# Patient Record
Sex: FEMALE | Race: WHITE
[De-identification: names, ages, dates, MRNs, and addresses within clinical notes are randomized per-mention and may not be internally consistent; named-entity substitution may affect disease eponyms.]

---

## 2017-02-10 ENCOUNTER — HOSPITAL ENCOUNTER (OUTPATIENT)
Dept: HOSPITAL 47 - LABPAT | Age: 57
Discharge: HOME | End: 2017-02-10
Payer: COMMERCIAL

## 2017-02-10 DIAGNOSIS — Z01.812: Primary | ICD-10-CM

## 2017-02-10 LAB
ANION GAP SERPL CALC-SCNC: 11 MMOL/L
APTT BLD: 24 SEC (ref 22–30)
BUN SERPL-SCNC: 17 MG/DL (ref 7–17)
CALCIUM SPEC-MCNC: 10 MG/DL (ref 8.4–10.2)
CELLS COUNTED: 100
CH: 29.8
CHCM: 33.1
CHLORIDE SERPL-SCNC: 101 MMOL/L (ref 98–107)
CO2 SERPL-SCNC: 29 MMOL/L (ref 22–30)
ERYTHROCYTE [DISTWIDTH] IN BLOOD BY AUTOMATED COUNT: 4.55 M/UL (ref 3.8–5.4)
ERYTHROCYTE [DISTWIDTH] IN BLOOD: 12.9 % (ref 11.5–15.5)
GLUCOSE SERPL-MCNC: 106 MG/DL (ref 74–99)
HCT VFR BLD AUTO: 41.2 % (ref 34–46)
HDW: 2.37
HGB BLD-MCNC: 13.4 GM/DL (ref 11.4–16)
INR PPP: 1.1 (ref ?–1.1)
MCH RBC QN AUTO: 29.4 PG (ref 25–35)
MCHC RBC AUTO-ENTMCNC: 32.5 G/DL (ref 31–37)
MCV RBC AUTO: 90.4 FL (ref 80–100)
NON-AFRICAN AMERICAN GFR(MDRD): >60
PH UR: 7 [PH] (ref 5–8)
POTASSIUM SERPL-SCNC: 4.8 MMOL/L (ref 3.5–5.1)
PT BLD: 10.8 SEC (ref 9–12)
SODIUM SERPL-SCNC: 141 MMOL/L (ref 137–145)
SP GR UR: 1.02 (ref 1–1.03)
UA BILLING (MACRO VS. MICRO): (no result)
UROBILINOGEN UR QL STRIP: <2 MG/DL (ref ?–2)
WBC # BLD AUTO: 10 K/UL (ref 3.8–10.6)
WBC (PEROX): 16.62

## 2017-02-10 PROCEDURE — 85025 COMPLETE CBC W/AUTO DIFF WBC: CPT

## 2017-02-10 PROCEDURE — 87070 CULTURE OTHR SPECIMN AEROBIC: CPT

## 2017-02-10 PROCEDURE — 81003 URINALYSIS AUTO W/O SCOPE: CPT

## 2017-02-10 PROCEDURE — 80048 BASIC METABOLIC PNL TOTAL CA: CPT

## 2017-02-10 PROCEDURE — 85730 THROMBOPLASTIN TIME PARTIAL: CPT

## 2017-02-10 PROCEDURE — 85610 PROTHROMBIN TIME: CPT

## 2017-03-06 ENCOUNTER — HOSPITAL ENCOUNTER (INPATIENT)
Dept: HOSPITAL 47 - 2ORMAIN | Age: 57
LOS: 1 days | Discharge: HOME | DRG: 473 | End: 2017-03-07
Payer: COMMERCIAL

## 2017-03-06 VITALS — BODY MASS INDEX: 29 KG/M2

## 2017-03-06 VITALS — RESPIRATION RATE: 16 BRPM

## 2017-03-06 DIAGNOSIS — M48.02: Primary | ICD-10-CM

## 2017-03-06 DIAGNOSIS — G43.909: ICD-10-CM

## 2017-03-06 DIAGNOSIS — M25.78: ICD-10-CM

## 2017-03-06 DIAGNOSIS — K21.9: ICD-10-CM

## 2017-03-06 DIAGNOSIS — F32.9: ICD-10-CM

## 2017-03-06 DIAGNOSIS — I48.91: ICD-10-CM

## 2017-03-06 DIAGNOSIS — N32.81: ICD-10-CM

## 2017-03-06 DIAGNOSIS — Z79.51: ICD-10-CM

## 2017-03-06 DIAGNOSIS — M50.123: ICD-10-CM

## 2017-03-06 DIAGNOSIS — M50.322: ICD-10-CM

## 2017-03-06 DIAGNOSIS — Z79.899: ICD-10-CM

## 2017-03-06 DIAGNOSIS — Z79.82: ICD-10-CM

## 2017-03-06 DIAGNOSIS — J44.9: ICD-10-CM

## 2017-03-06 LAB — GLUCOSE BLD-MCNC: 93 MG/DL (ref 75–99)

## 2017-03-06 PROCEDURE — 0RG10K0 FUSION OF CERVICAL VERTEBRAL JOINT WITH NONAUTOLOGOUS TISSUE SUBSTITUTE, ANTERIOR APPROACH, ANTERIOR COLUMN, OPEN APPROACH: ICD-10-PCS

## 2017-03-06 PROCEDURE — 86900 BLOOD TYPING SEROLOGIC ABO: CPT

## 2017-03-06 PROCEDURE — 86850 RBC ANTIBODY SCREEN: CPT

## 2017-03-06 PROCEDURE — 86901 BLOOD TYPING SEROLOGIC RH(D): CPT

## 2017-03-06 PROCEDURE — 72020 X-RAY EXAM OF SPINE 1 VIEW: CPT

## 2017-03-06 RX ADMIN — BENZOCAINE AND MENTHOL PRN EACH: 15; 3.6 LOZENGE ORAL at 22:21

## 2017-03-06 RX ADMIN — POTASSIUM CHLORIDE SCH MLS: 14.9 INJECTION, SOLUTION INTRAVENOUS at 13:37

## 2017-03-06 RX ADMIN — OXYBUTYNIN CHLORIDE SCH MG: 10 TABLET, EXTENDED RELEASE ORAL at 19:54

## 2017-03-06 RX ADMIN — HYDROMORPHONE HYDROCHLORIDE PRN MG: 1 INJECTION, SOLUTION INTRAMUSCULAR; INTRAVENOUS; SUBCUTANEOUS at 20:38

## 2017-03-06 RX ADMIN — POTASSIUM CHLORIDE SCH: 14.9 INJECTION, SOLUTION INTRAVENOUS at 23:42

## 2017-03-06 RX ADMIN — DILTIAZEM HYDROCHLORIDE SCH MG: 30 TABLET, FILM COATED ORAL at 19:54

## 2017-03-06 RX ADMIN — HYDROMORPHONE HYDROCHLORIDE PRN MG: 1 INJECTION, SOLUTION INTRAMUSCULAR; INTRAVENOUS; SUBCUTANEOUS at 23:44

## 2017-03-06 RX ADMIN — HYDROMORPHONE HYDROCHLORIDE PRN MG: 1 INJECTION, SOLUTION INTRAMUSCULAR; INTRAVENOUS; SUBCUTANEOUS at 17:30

## 2017-03-06 RX ADMIN — HYDROMORPHONE HYDROCHLORIDE PRN MG: 1 INJECTION, SOLUTION INTRAMUSCULAR; INTRAVENOUS; SUBCUTANEOUS at 17:36

## 2017-03-06 RX ADMIN — CEFAZOLIN SCH MLS/HR: 330 INJECTION, POWDER, FOR SOLUTION INTRAMUSCULAR; INTRAVENOUS at 23:51

## 2017-03-06 RX ADMIN — CEFAZOLIN SCH: 330 INJECTION, POWDER, FOR SOLUTION INTRAMUSCULAR; INTRAVENOUS at 19:50

## 2017-03-06 RX ADMIN — HYDROCODONE BITARTRATE AND ACETAMINOPHEN PRN EACH: 5; 325 TABLET ORAL at 22:24

## 2017-03-06 NOTE — XR
EXAMINATION TYPE: XR cervical spine 1V

 

DATE OF EXAM: 3/6/2017 5:12 PM

 

COMPARISON: Today

 

HISTORY: Hardware placement

 

TECHNIQUE: Single view

 

FINDINGS: There is a plate with screws fusing anteriorly the lower cervical spine at C5-6. I see no c
omplicating process.

 

IMPRESSION: No complicating process seen.

## 2017-03-06 NOTE — P.OP
Date of Procedure: 03/06/17


Preoperative Diagnosis: 


Cervical stenosis C5 6


Herniated nucleus pulposis C5 6


Degenerative disc disease C5 6 C6 7


Large osteophytic spurring C5 6


Upper extremity radiculopathy


Postoperative Diagnosis: 


Same


Anesthesia: GETA


Pathology: none sent


Condition: stable


Disposition: PACU


Description of Procedure: 


BRIEF OPERATIVE NOTE





Preoperative Diagnosis: Cervical stenosis C5 6, degenerative disc disease C5 6 

C6 7, herniated nucleus was a C5 6, large osteophytic spurring C5 6, upper 

extremity radiculopathy


Postoperative Diagnosis: Same


Procedure: Anterior cervical decompression and fusion C5 6


                  Placement of allograft interbody graft C5 6


                  Application of anterior cervical plate C5 6


Surgeon: Dr. Morrison


Assistant: Cabrera SORENSON who is present throughout the entire the case 

persistence during positioning, dissection, exposure, visualization, and all 

crucial elements of the case as well as closure.


Anesthesia: General anesthesia


Estimated blood loss: Approximately 50 mL


Complications: None apparent


Components implanted: K2M anterior cervical plate system with screws and Vikos 

allograft bone graft


Disposition: To recovery room in good stable condition.





OPERATIVE INDICATIONS





The patient has had long-standing issues in their neck and upper extremities.  

She is not have degenerative disc changes C5 6 and C6 7 with significant 

stenosis at C5 6 with herniated nucleus pulposis.  She had large osteophytic 

spurring as well.  She had significant upper extremity radiculopathy.  Her 

imaging correlated well with her upper extremity and back symptoms.  The 

patient has been through conservative treatment.  We discussed various 

treatment options including surgery, at C5 6 and possibly at C6 7, and the 

patient wishes to proceed with surgery We discussed the risk, patient's 

alternatives and benefits of surgery including but not limited to, risk of 

bleeding risk of infection, risk of need for further surgery, risk of decreased

, loss of motion, muscle function, malunion nonunion, hardware failure, nerve 

damage, paralysis, heart attack, and death.





OPERATIVE SUMMARY





After discussing all the risks, patient alternatives and benefits at length, 

the patient elected to proceed with surgical intervention, signed informed 

consent, and presented for their procedure.  The patient was seen and examined 

in the preoperative holding area and the surgical site was marked.  The patient 

was given antibiotics and brought to the operating room.





The patient was positioned on the operating room table in a supine position 

being careful to pad any bony prominences and pressure points.  The patient was 

sedated and intubated by anesthesia in standard fashion.  Once the airway and C-

spine were stabilized the patient's arms were padded and tucked at her side, 

with her shoulders gently taped.  The head was placed in a donut pad with the 

neck in good neutral alignment and position.  We were careful to maintain the 

patient's cervical spine and good neutral alignment and position throughout.





The patient was prepped and draped in a normal standard fashion.  An 

appropriate timeout and keystone protocol performed.  We were able to proceed 

with the surgery.  The local wound area was infiltrated with local anesthetic. 





An incision was made transversely approximately 2-1/2 cm over the appropriate 

levels of C5 6.  Patient has large body habitus and this may benefit dissection 

somewhat more difficult.  Dissection was taken down subcutaneously to the level 

of the platysma which was split in line with its fibers.  Dissection was taken 

with a carotid approach, with the trachea and esophagus medial and the carotid 

sheath laterally.  We dissected down to the anterior surface of the vertebral 

bodies.  Intraoperative x-ray was taken which showed a marker at the 

appropriate level of C5 6. there were large anterior osteophytes at C5 6 which 

are palpable.  With the appropriate level positively confirmed, we were able to 

proceed with discectomy at the appropriate levels.  All of the operative levels 

were exposed appropriately. The patient had all their twitches back, and there 

was no evidence of recurrent laryngeal issue.  The wound was copiously 

irrigated and suctioned dry as had been done periodically throughout the case.  

At the appropriate level/levels, I established an annulotomy with an 11 blade 

scalpel.  A discectomy was performed with a combination of pituitary rongeurs, 

curettes, a high-speed bur, and Kerrison rongeurs.   there were very large 

anterior osteophytes and the large posterior osteophytes which were taken down 

as well.  The posterior longitudinal ligament was taken down as were any 

posterior osteophytes.  This gave good central and bilateral foraminal 

decompression.  There is no evidence of any dural tear or leak.  The endplates 

were prepared with a high-speed bur.  With the endplates in good parallel 

position, I was able to size for the appropriate size interbody graft.  The 

wound was irrigated and suctioned dry the graft was prepared and malleted into 

position.  It had good alignment and position with the anterior surface flush 

with the anterior surface of the vertebral bodies.  patient did have some 

degenerative changes at C6 7 but I felt that the large majority of her issues 

stem from the C5 6 level and decided to forego further dissection and fusion at 

C6 7.  





With the grafts intact, I was able to measure and contour and appropriate sized 

plate.  The plate was positioned at the midline over the appropriate levels at 

C5 6 .  Screw holes were established with a hand drill and drill guide.  Screws 

were placed in good alignment and position with excellent bony purchase.  They 

were seated under the locking device.  The construct was checked and found to 

be stable.  Intraoperative x-ray was taken which showed good alignment and 

position of the implants at the appropriate levels health of C5 6 .  There was 

no evidence of any dural tear or leak.  Good hemostasis was maintained.  The 

wound was copiously irrigated and suctioned dry as had been done  periodically 

throughout the case.





The platysma was closed with absorbable suture.  The subcutaneous tissue was 

closed.  The subcuticular tissue was closed with absorbable suture.  The wound 

was cleaned and dried and dressed appropriately.





A soft cervical collar was placed appropriately.  The patient was woken up by 

anesthesia, extubated, transferred back gently to their hospital bed and 

brought to the recovery room in good stable condition.





The patient will be admitted to the hospital for appropriate postoperative care

, medical management and monitoring.  We will continue to follow them closely 

about the postoperative course.

## 2017-03-06 NOTE — XR
EXAMINATION TYPE: XR cervical spine 1V

 

DATE OF EXAM: 3/6/2017 4:21 PM

 

COMPARISON: NONE

 

HISTORY: Neck pain, surgical study.

 

TECHNIQUE: Crosstable lateral view of cervical spine is obtained intraoperatively.

 

FINDINGS: Exam is for surgical planning and not for diagnostic purposes. A metallic pointer is noted 
at C5-C6 level where there is moderate spurring and disc space narrowing.

 

IMPRESSION: As above

## 2017-03-07 VITALS — TEMPERATURE: 98.9 F | SYSTOLIC BLOOD PRESSURE: 129 MMHG | HEART RATE: 69 BPM | DIASTOLIC BLOOD PRESSURE: 70 MMHG

## 2017-03-07 RX ADMIN — DILTIAZEM HYDROCHLORIDE SCH MG: 30 TABLET, FILM COATED ORAL at 07:09

## 2017-03-07 RX ADMIN — HYDROMORPHONE HYDROCHLORIDE PRN MG: 1 INJECTION, SOLUTION INTRAMUSCULAR; INTRAVENOUS; SUBCUTANEOUS at 02:44

## 2017-03-07 RX ADMIN — OXYBUTYNIN CHLORIDE SCH MG: 10 TABLET, EXTENDED RELEASE ORAL at 07:09

## 2017-03-07 RX ADMIN — BENZOCAINE AND MENTHOL PRN EACH: 15; 3.6 LOZENGE ORAL at 07:54

## 2017-03-07 RX ADMIN — HYDROCODONE BITARTRATE AND ACETAMINOPHEN PRN EACH: 5; 325 TABLET ORAL at 05:18

## 2017-05-04 ENCOUNTER — HOSPITAL ENCOUNTER (OUTPATIENT)
Dept: HOSPITAL 47 - RADUSWWP | Age: 57
Discharge: HOME | End: 2017-05-04
Payer: COMMERCIAL

## 2017-05-04 DIAGNOSIS — R10.9: Primary | ICD-10-CM

## 2017-05-04 PROCEDURE — 76700 US EXAM ABDOM COMPLETE: CPT

## 2017-05-05 NOTE — US
EXAMINATION TYPE: US abdomen complete

 

DATE OF EXAM: 5/4/2017 4:51 PM

 

COMPARISON: NONE

 

CLINICAL HISTORY: R10.84 ABDOMINAL PAIN. Epigastric pain, NPO, GB removed 1993

 

EXAM MEASUREMENTS:

 

Liver Length:  16.5 cm   

CHD:  1.3 cm

Spleen:  11.5 cm   

Right Kidney:  13.0 x 4.8 x 4.7 cm 

Left Kidney:  13.8 x 4.6 x 4.7 cm   

 

Suboptimal visualization due to patient body habitus 

 

Pancreas:  echogenic, tail not well seen due to overlying bowel gas 

Liver:  echogenic, heterogenous  

Gallbladder:  Surgically absent

CHD:  Dilated 

Spleen:  wnl   

Right Kidney:  wnl   

Left Kidney:  wnl   

Upper IVC:  seen  

Abd Aorta:  seen

 

The liver is normal in size but echogenic and likely fatty infiltrated.

 

The pancreas is also echogenic and may be fatty infiltrated.

 

The gallbladder is been removed. Distal common hepatic duct measures 1.3 cm.

 

The spleen is normal in size.

 

Both kidneys appear normal.

 

Visualized portions of aorta and IVC are normal.

 

IMPRESSION: 

 

PROBABLE FATTY INFILTRATION OF THE LIVER.

## 2017-06-08 ENCOUNTER — HOSPITAL ENCOUNTER (OUTPATIENT)
Dept: HOSPITAL 47 - RADCTMAIN | Age: 57
Discharge: HOME | End: 2017-06-08
Payer: COMMERCIAL

## 2017-06-08 DIAGNOSIS — K44.9: ICD-10-CM

## 2017-06-08 DIAGNOSIS — R91.8: Primary | ICD-10-CM

## 2017-06-08 PROCEDURE — 71260 CT THORAX DX C+: CPT

## 2017-06-08 NOTE — CT
EXAMINATION TYPE: CT chest w con

 

DATE OF EXAM: 6/8/2017

 

COMPARISON: 3/18/2013

 

HISTORY: 57-year-old female Chronic bronchitis

 

TECHNIQUE: Contiguous axial scanning of the chest after the administration of 100 ml mL of Omnipaque 
300.  Coronal/sagittal reconstructions performed.

 

CT DLP: 690.80mGycm. Automatic exposure control utilized for a dose reduction.

 

 

FINDINGS:

The heart is normal size with trace anterior pericardial thickening/fluid.

 

Aorta is normal caliber with conventional arch vessel branching anatomy.

 

No thoracic lymphadenopathy by CT size criteria.

 

Evaluation of the lungs shows calcified right hilar lymph nodes suggesting prior granulomatous diseas
e. No consolidation or pleural effusion.

 

5 mm pulmonary nodule left mid lung along the major fissure. Axial image 30.

6 mm pulmonary nodule anteromedial right middle lobe axial image 34.

 

Tiny hiatal hernia.  Low density of the hepatic parenchyma suggests underlying hepatic steatosis. Cho
lecystectomy clips are present.

 

Bones: Degenerative disc disease mid to lower thoracic spine with anterior endplate spondylosis.

 

 

 

IMPRESSION:  

 

1. No acute pulmonary process.

2. A pulmonary nodule, one in each side measuring up to 6 cm. 6-12 followed by 18-24 month follow-up 
exams are recommended to ensure stability.

3. Tiny hiatal hernia and suspected hepatic steatosis.

## 2017-06-14 ENCOUNTER — HOSPITAL ENCOUNTER (OUTPATIENT)
Dept: HOSPITAL 47 - LABWHC1 | Age: 57
Discharge: HOME | End: 2017-06-14
Payer: COMMERCIAL

## 2017-06-14 DIAGNOSIS — R06.00: Primary | ICD-10-CM

## 2017-06-14 LAB
ALP SERPL-CCNC: 98 U/L (ref 38–126)
ALT SERPL-CCNC: 46 U/L (ref 9–52)
ANION GAP SERPL CALC-SCNC: 10 MMOL/L
AST SERPL-CCNC: 23 U/L (ref 14–36)
BASOPHILS # BLD AUTO: 0 K/UL (ref 0–0.2)
BASOPHILS NFR BLD AUTO: 0 %
BUN SERPL-SCNC: 14 MG/DL (ref 7–17)
CALCIUM SPEC-MCNC: 9.9 MG/DL (ref 8.4–10.2)
CH: 30.5
CHCM: 34.8
CHLORIDE SERPL-SCNC: 107 MMOL/L (ref 98–107)
CK SERPL-CCNC: 161 U/L (ref 30–135)
CO2 SERPL-SCNC: 27 MMOL/L (ref 22–30)
EOSINOPHIL # BLD AUTO: 0.4 K/UL (ref 0–0.7)
EOSINOPHIL NFR BLD AUTO: 4 %
ERYTHROCYTE [DISTWIDTH] IN BLOOD BY AUTOMATED COUNT: 4.5 M/UL (ref 3.8–5.4)
ERYTHROCYTE [DISTWIDTH] IN BLOOD: 12.9 % (ref 11.5–15.5)
GLUCOSE SERPL-MCNC: 98 MG/DL (ref 74–99)
HCT VFR BLD AUTO: 39.6 % (ref 34–46)
HDW: 2.61
HGB BLD-MCNC: 13.5 GM/DL (ref 11.4–16)
LUC NFR BLD AUTO: 2 %
LYMPHOCYTES # SPEC AUTO: 2 K/UL (ref 1–4.8)
LYMPHOCYTES NFR SPEC AUTO: 21 %
MCH RBC QN AUTO: 29.9 PG (ref 25–35)
MCHC RBC AUTO-ENTMCNC: 33.9 G/DL (ref 31–37)
MCV RBC AUTO: 88 FL (ref 80–100)
MONOCYTES # BLD AUTO: 0.4 K/UL (ref 0–1)
MONOCYTES NFR BLD AUTO: 5 %
NEUTROPHILS # BLD AUTO: 6.4 K/UL (ref 1.3–7.7)
NEUTROPHILS NFR BLD AUTO: 68 %
NON-AFRICAN AMERICAN GFR(MDRD): >60
POTASSIUM SERPL-SCNC: 4.2 MMOL/L (ref 3.5–5.1)
PROT SERPL-MCNC: 7.6 G/DL (ref 6.3–8.2)
RHEUMATOID FACT SERPL-ACNC: <9 IU/ML (ref ?–12)
SODIUM SERPL-SCNC: 144 MMOL/L (ref 137–145)
WBC # BLD AUTO: 0.23 10*3/UL
WBC # BLD AUTO: 9.4 K/UL (ref 3.8–10.6)
WBC (PEROX): 9.57

## 2017-06-14 PROCEDURE — 80053 COMPREHEN METABOLIC PANEL: CPT

## 2017-06-14 PROCEDURE — 82550 ASSAY OF CK (CPK): CPT

## 2017-06-14 PROCEDURE — 85025 COMPLETE CBC W/AUTO DIFF WBC: CPT

## 2017-06-14 PROCEDURE — 86431 RHEUMATOID FACTOR QUANT: CPT

## 2017-06-14 PROCEDURE — 85652 RBC SED RATE AUTOMATED: CPT

## 2017-06-14 PROCEDURE — 84439 ASSAY OF FREE THYROXINE: CPT

## 2017-06-14 PROCEDURE — 36415 COLL VENOUS BLD VENIPUNCTURE: CPT

## 2017-06-14 PROCEDURE — 84443 ASSAY THYROID STIM HORMONE: CPT

## 2017-06-14 PROCEDURE — 86038 ANTINUCLEAR ANTIBODIES: CPT

## 2017-06-30 ENCOUNTER — HOSPITAL ENCOUNTER (OUTPATIENT)
Dept: HOSPITAL 47 - RADNMMAIN | Age: 57
Discharge: HOME | End: 2017-06-30
Payer: COMMERCIAL

## 2017-06-30 ENCOUNTER — HOSPITAL ENCOUNTER (OUTPATIENT)
Dept: HOSPITAL 47 - RADCTMAIN | Age: 57
Discharge: HOME | End: 2017-06-30
Payer: COMMERCIAL

## 2017-06-30 DIAGNOSIS — R06.02: ICD-10-CM

## 2017-06-30 DIAGNOSIS — M77.32: Primary | ICD-10-CM

## 2017-06-30 DIAGNOSIS — Z53.9: Primary | ICD-10-CM

## 2017-06-30 DIAGNOSIS — Z98.890: ICD-10-CM

## 2017-06-30 DIAGNOSIS — R10.11: ICD-10-CM

## 2017-06-30 DIAGNOSIS — M79.672: ICD-10-CM

## 2017-06-30 PROCEDURE — 74177 CT ABD & PELVIS W/CONTRAST: CPT

## 2017-06-30 PROCEDURE — 71020: CPT

## 2017-06-30 PROCEDURE — 78582 LUNG VENTILAT&PERFUS IMAGING: CPT

## 2017-06-30 PROCEDURE — 73700 CT LOWER EXTREMITY W/O DYE: CPT

## 2017-06-30 NOTE — XR
EXAMINATION TYPE: XR chest 2V

 

DATE OF EXAM: 6/30/2017

 

COMPARISON: 1/8/15

 

HISTORY: Shortness of breath

 

TECHNIQUE:  Frontal and lateral views of the chest are obtained.

 

FINDINGS:

 

Scattered senescent parenchymal changes noted. Hyperinflation compatible with COPD. 

 

No evidence for infiltrate. No evidence for atelectasis.

 

Heart size is stable.

 

Mediastinal structures are stable and grossly unremarkable.

 

No evidence for hilar prominence.

 

Degenerative changes dorsal spine. 

 

IMPRESSION:

1. No evidence for acute pulmonary disease.

## 2017-06-30 NOTE — NM
EXAMINATION TYPE: NM pul vent and perfuse

 

DATE OF EXAM: 6/30/2017

 

COMPARISON: NONE

 

HISTORY: Shortness of breath

 

TECHNIQUE:  Utilizing inhalation of 70.8 mCi Tc 99m DTPA aerosol and intravenous injection of 5.5 mCi
 of Tc 99m MAA, ventilation and perfusion images are acquired post injection in multiple projections.


 

FINDINGS: 

Normal radiotracer distribution is noted in the lungs. There is no evidence for ventilation/perfusion
 mismatch.

 

IMPRESSION: 

No scintigraphic evidence to suggest PE.

## 2017-07-01 NOTE — CT
EXAMINATION TYPE: CT abdomen pelvis w con

 

DATE OF EXAM: 6/30/2017

 

COMPARISON: NONE

 

INDICATION: Patient complains of epigastric to RUQ pain, tenderness, and difficulty taking a deep radha
ath.

 

DLP: 2520.4 mGycm, Automated exposure control for dose reduction was used.

 

CONTRAST:  100 mL of Omnipaque 300. 

                        Study performed with Oral Contrast

 

TECHNIQUE: Axial images were obtained from above the diaphragm to the pubic rami in the axial plane a
t 5 mm thick sections.  Reconstructed images are reviewed on the computer in the coronal plane.  

 

FINDINGS:

 

Limited CT sections are obtained the lung bases.  The lung bases are clear.

 

CT ABDOMEN:

 

Liver: There is mild fatty infiltration to the liver compared to the spleen. No discrete masses or cy
sts are evident.

 

Spleen: Normal

 

Pancreas: Normal

 

Adrenal glands: The adrenal glands are normal.

 

Gallbladder: Surgically absent  

 

Kidneys: No masses are evident. No hydronephrosis is present.   No cysts are present.  Delayed images
 were obtained through the kidneys, which remain unremarkable.

 

Aorta: Normal .  There is a retrocaval right renal artery.

 

Inferior vena cava: Normal.

 

CT PELVIS: 

Loops of bowel within the abdomen and pelvis are normal.     There are loops of bowel which are incom
pletely distended or lack oral contrast limiting their evaluation.

 

Appendix: Normal as visualized.

 

Urinary bladder: Normal. 

 

Genitourinary structures: Uterus and ovaries are absent. Some residual left ovarian tissue may be pre
sent.

 

Osseous structures: No suspicious lytic or sclerotic lesions. Facet degenerative changes are present.


 

IMPRESSIONS:

1.  No acute abdominal process.

2. Mild fatty infiltration of the liver

## 2017-07-01 NOTE — CT
EXAMINATION TYPE: CT foot LT wo con

 

DATE OF EXAM: 6/30/2017

 

COMPARISON: NONE

 

HISTORY: Left foot pain. Injury 1 year ago with surgery.

 

CT DLP: 143.30 mGycm

Automated exposure control for dose reduction was used.

 

TECHNIQUE: Axial images 2 mm thick sections. Reconstructed images in the coronal and sagittal plane.

 

FINDINGS: 

The ankle mortise appears intact. There is been prior forefoot open reduction internal fixation. [place]im[place]
tarsals as visualized appear intact. Digits within the field-of-view appear normal. Degenerative join
t changes are present through the forefoot.

 

IMPRESSION: 

1. POSTSURGICAL CHANGES THROUGH THE FOREFOOT WITH DEGENERATIVE JOINT CHANGES OF THE TARSOMETATARSAL J
UNCTIONS.

2. NO ACUTE FRACTURE EVIDENT.

3. PLANTAR CALCANEAL HEEL SPUR.

## 2017-07-19 ENCOUNTER — HOSPITAL ENCOUNTER (OUTPATIENT)
Dept: HOSPITAL 47 - RADMAMWWP | Age: 57
Discharge: HOME | End: 2017-07-19
Payer: COMMERCIAL

## 2017-07-19 DIAGNOSIS — Z12.31: Primary | ICD-10-CM

## 2017-07-20 NOTE — MM
Reason for exam: screening  (asymptomatic).

Last mammogram was performed 1 year ago.



History:

Family history of breast cancer in sister at age 30 and breast cancer in sister at

age 48.

Benign ultrasound-guided core biopsy of the right breast, 2004.



Physical Findings:

A clinical breast exam by your physician is recommended on an annual basis and 

results should be correlated with mammographic findings.



MG Screening Mammo w CAD

Bilateral CC and MLO view(s) were taken.

Prior study comparison: July 13, 2016, bilateral MG screening mammo w CAD.  July 28, 2015, bilateral MG screening mammo w CAD.

There are scattered fibroglandular densities.  There is chronic nodularity in the 

right breast, stable.  No significant changes when compared with prior studies.





ASSESSMENT: Benign, BI-RAD 2



RECOMMENDATION:

Routine screening mammogram of both breasts in 1 year.

## 2017-09-18 ENCOUNTER — HOSPITAL ENCOUNTER (OUTPATIENT)
Dept: HOSPITAL 47 - LABWHC1 | Age: 57
Discharge: HOME | End: 2017-09-18
Payer: COMMERCIAL

## 2017-09-18 DIAGNOSIS — E55.9: Primary | ICD-10-CM

## 2017-09-18 DIAGNOSIS — Z47.89: ICD-10-CM

## 2017-09-18 DIAGNOSIS — M79.672: ICD-10-CM

## 2017-09-18 LAB
CH: 30.4
CHCM: 34.2
ERYTHROCYTE [DISTWIDTH] IN BLOOD BY AUTOMATED COUNT: 4.34 M/UL (ref 3.8–5.4)
ERYTHROCYTE [DISTWIDTH] IN BLOOD: 13.5 % (ref 11.5–15.5)
HCT VFR BLD AUTO: 38.9 % (ref 34–46)
HDW: 2.5
HGB BLD-MCNC: 12.8 GM/DL (ref 11.4–16)
MCH RBC QN AUTO: 29.6 PG (ref 25–35)
MCHC RBC AUTO-ENTMCNC: 33.1 G/DL (ref 31–37)
MCV RBC AUTO: 89.5 FL (ref 80–100)
WBC # BLD AUTO: 7.7 K/UL (ref 3.8–10.6)

## 2017-09-18 PROCEDURE — 85027 COMPLETE CBC AUTOMATED: CPT

## 2017-09-18 PROCEDURE — 83520 IMMUNOASSAY QUANT NOS NONAB: CPT

## 2017-09-18 PROCEDURE — 82040 ASSAY OF SERUM ALBUMIN: CPT

## 2017-09-18 PROCEDURE — 86140 C-REACTIVE PROTEIN: CPT

## 2017-09-18 PROCEDURE — 36415 COLL VENOUS BLD VENIPUNCTURE: CPT

## 2017-09-18 PROCEDURE — 82306 VITAMIN D 25 HYDROXY: CPT

## 2017-09-18 PROCEDURE — 85652 RBC SED RATE AUTOMATED: CPT

## 2017-11-08 ENCOUNTER — HOSPITAL ENCOUNTER (OUTPATIENT)
Dept: HOSPITAL 47 - SLEEP | Age: 57
End: 2017-11-08
Attending: INTERNAL MEDICINE
Payer: COMMERCIAL

## 2017-11-08 DIAGNOSIS — Z79.82: ICD-10-CM

## 2017-11-08 DIAGNOSIS — Z90.721: ICD-10-CM

## 2017-11-08 DIAGNOSIS — R32: ICD-10-CM

## 2017-11-08 DIAGNOSIS — G47.33: Primary | ICD-10-CM

## 2017-11-08 DIAGNOSIS — Z79.899: ICD-10-CM

## 2017-11-08 DIAGNOSIS — E66.9: ICD-10-CM

## 2017-11-08 DIAGNOSIS — Z90.49: ICD-10-CM

## 2017-11-08 DIAGNOSIS — Z90.710: ICD-10-CM

## 2017-11-08 DIAGNOSIS — I48.0: ICD-10-CM

## 2017-11-08 DIAGNOSIS — Z87.891: ICD-10-CM

## 2017-11-08 DIAGNOSIS — Z90.89: ICD-10-CM

## 2017-11-08 DIAGNOSIS — F32.9: ICD-10-CM

## 2017-11-08 PROCEDURE — 99211 OFF/OP EST MAY X REQ PHY/QHP: CPT

## 2017-11-08 NOTE — CONS
CONSULTATION



DATE OF SERVICE:

2017





57-year-old lady has [facility] for possible obstructive sleep

apnea-hypopnea syndrome.



HISTORY OF PRESENT ILLNESS/ SLEEP WAKE EVALUATION:



SLEEP SCHEDULE:

Patient usual sleep schedule from around 11:00 p.m. until 8 or 9:00 a.m.



FALLING ASLEEP:

Sometimes he has problem with falling asleep for more than 30 minutes, but not more

than 1 hour.  Has TV set in bedroom.



DURING SLEEP:

Usually sleeps on the side position,  wakes up from sleep in the middle of the night

with nocturia.  She snores.



DURING THE DAY:

Wakes up tired in the morning.  [place] Sleepiness Scale increased to 10.



PAST MEDICAL HISTORY:

Positive for paroxysmal atrial fibrillation and depression, urinary incontinence.



PAST SURGICAL HISTORY:

Tonsillectomy, , partial hysterectomy, cervical repair, left foot surgery for

Charcot foot.



MEDICATIONS:

Cardizem, Celexa, Norco, oxybutynin, aspirin, vitamin D.



SOCIAL HISTORY:

Positive for smoking about half pack a day for 40 years, quit around .  Alcohol

consumption none at the present time.



FAMILY HISTORY:

Hypertension, angina, heart problems, hyperlipidemia, arthritis, sinus problems,

cancer, sleep apnea, bronchitis, acid reflux, liver problems, diabetes.



REVIEW OF SYSTEMS:

Awakenings from sleep, tiredness and sleepiness during the day.  Swelling of the legs,

shortness of breath.



PHYSICAL EXAM:

57-year-old  lady without distress /78, HR 67, RR 18, height 6 feet

inches 0, weight 311.8, BMI 42.1.  Neck 19-1/2 inches in circumference.  Temperature

97.8.  Oxygen saturation at room air 96%.  Oropharynx short distance between soft

palate and posterior pharyngeal wall.

Neck  Supple, no JVD.  Thyroid is not palpable.

LUNGS  Clear to percussion and to auscultation.  Good air exchange.  No wheezing or

rhonchi.

HEART  S1, S2 regular.  No murmurs, gallops, or rubs.

ABDOMEN: Obese.  Soft and nontender.  Bowel sounds are present.  No organomegaly

appreciated.

EXTREMITIES  No clubbing or cyanosis.

CNS  Awake, alert, and oriented X3.  Cranial nerves 2 to 7 intact.  There is no

fasciculation or atrophy. noted.  No focal deficits observed.

ABDOMEN:  Obese.  Heart S1, S2.  Regular.  Mild systolic murmur on the aorta.  4

extremities 1+ ankle edema.



IMPRESSION:

1. Snoring, awakenings from sleep, short distance between soft palate and pharyngeal

    wall, sleepiness.  [place] Sleepiness Scale is 10.  Obesity, wide neck, obstructive

    sleep apnea-hypopnea syndrome.

2. Obesity BMI 42.1.

3. History of paroxysmal atrial fibrillation.

4. History of depression.

5. Urinary incontinence.

6. Status post tonsillectomy.

7. Status post .

8. Status post hysterectomy with one ovary removed.

9. Status post cyst removed from the back.

10.Status post left foot reconstruction for Charcot foot.

11.Status post cervical repair in 2017.

12.Status post pilonidal cyst removed.

13.Status post cholecystectomy.



PLAN:

1. Polysomnography for evaluation of patient's breathing during sleep.

2. CPAP/BiPAP titration if sleep study confirms obstructive sleep apnea-hypopnea

    syndrome.

3. Preferable position during sleep on the side.

4. No driving if patient feels any sleepiness.  Patient is aware of  civil and

    criminal liability for unsafe driving.

5. I will see patient for follow up visit to explain results of testing and following

    plan.



Thank you very much for referring this patient for consultation.



Sincerely,







Norbert Patel MD, PhD, FAASM

Diplomat of [employer]ties

American Board of Internal Medicine

Medical Director of [facility]





MICHAEL / LEANN: [id number] / Job#: [id number]

## 2017-11-08 NOTE — CONS
CONSULTATION



DATE OF SERVICE:

11/08/17.



ADDENDUM:

The patient was seen today for consultation, but I did not know that the patient had a

home sleep study in 2017 this year, ordered by Dr. Palmer.  Home sleep study showed

apnea-hypopnea index 15 with obstructive and central sleep apnea and oxygen

desaturation to 80%.



IMPRESSION:

Obstructive sleep apnea-hypopnea syndrome confirmed by home sleep apnea test in

moderate range.



PLAN:

CPAP titration for correction of respiratory abnormalities during sleep and other

recommendations as it was during consultation.



Sincerely,



Norbert Patel MD, PhD, FAASM

Diplomat of [employer] Specialties

American Board of Internal Medicine

Medical Director of [facility]





MMODL / IJN: [id number] / Job#: [id number]

## 2017-11-10 ENCOUNTER — HOSPITAL ENCOUNTER (OUTPATIENT)
Dept: HOSPITAL 47 - OR | Age: 57
LOS: 1 days | Discharge: HOME | End: 2017-11-11
Payer: COMMERCIAL

## 2017-11-10 VITALS — BODY MASS INDEX: 41.4 KG/M2

## 2017-11-10 VITALS — RESPIRATION RATE: 16 BRPM

## 2017-11-10 DIAGNOSIS — I48.91: ICD-10-CM

## 2017-11-10 DIAGNOSIS — M96.0: Primary | ICD-10-CM

## 2017-11-10 DIAGNOSIS — J44.9: ICD-10-CM

## 2017-11-10 DIAGNOSIS — M14.672: ICD-10-CM
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
## 2017-11-10 DIAGNOSIS — E66.01: ICD-10-CM
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
## 2017-11-10 DIAGNOSIS — Z79.899: ICD-10-CM

## 2017-11-10 DIAGNOSIS — F17.200: ICD-10-CM

## 2017-11-10 DIAGNOSIS — Z88.5: ICD-10-CM

## 2017-11-10 DIAGNOSIS — G62.9: ICD-10-CM

## 2017-11-10 DIAGNOSIS — F32.9: ICD-10-CM

## 2017-11-10 DIAGNOSIS — Z79.82: ICD-10-CM

## 2017-11-10 LAB — GLUCOSE BLD-MCNC: 92 MG/DL (ref 75–99)

## 2017-11-10 PROCEDURE — 73630 X-RAY EXAM OF FOOT: CPT

## 2017-11-10 PROCEDURE — 82306 VITAMIN D 25 HYDROXY: CPT

## 2017-11-10 PROCEDURE — 97116 GAIT TRAINING THERAPY: CPT

## 2017-11-10 PROCEDURE — 28737 REVISION OF FOOT BONES: CPT

## 2017-11-10 PROCEDURE — 87070 CULTURE OTHR SPECIMN AEROBIC: CPT

## 2017-11-10 PROCEDURE — 87075 CULTR BACTERIA EXCEPT BLOOD: CPT

## 2017-11-10 PROCEDURE — 87205 SMEAR GRAM STAIN: CPT

## 2017-11-10 PROCEDURE — 20680 REMOVAL OF IMPLANT DEEP: CPT

## 2017-11-10 PROCEDURE — 97161 PT EVAL LOW COMPLEX 20 MIN: CPT

## 2017-11-10 RX ADMIN — POTASSIUM CHLORIDE SCH MLS: 14.9 INJECTION, SOLUTION INTRAVENOUS at 12:42

## 2017-11-10 RX ADMIN — SULFAMETHOXAZOLE AND TRIMETHOPRIM SCH EACH: 800; 160 TABLET ORAL at 20:06

## 2017-11-10 RX ADMIN — HYDROCODONE BITARTRATE AND ACETAMINOPHEN PRN EACH: 10; 325 TABLET ORAL at 20:09

## 2017-11-10 RX ADMIN — ASPIRIN 325 MG ORAL TABLET SCH MG: 325 PILL ORAL at 20:06

## 2017-11-10 RX ADMIN — POTASSIUM CHLORIDE SCH MLS/HR: 14.9 INJECTION, SOLUTION INTRAVENOUS at 20:02

## 2017-11-10 NOTE — P.OP
Date of Procedure: 11/10/17


Preoperative Diagnosis: 


1.  Nonunion left midfoot fusion


2.  History of nondiabetic Charcot arthropathy


3.  Peripheral neuropathy


4.  Morbid obesity with BMI 41


Postoperative Diagnosis: 


Same


Procedure(s) Performed: 


1.  revision midfoot fusion left foot first, second, third tarsometatarsal 

joint and naviculocuneiform joints


2.  Hardware removal left foot


3.  Application of short leg splint by physician


Anesthesia: RISA


Surgeon: Valentin Mead


Assistant #1: Aren Paredes


Estimated Blood Loss (ml): 50


IV fluids (ml): 1,300


Pathology: other (Deep cultures)


Condition: stable


Disposition: PACU


Indications for Procedure: 


The patient is a 57-year-old female with multiple medical problems including 

nondiabetic Charcot arthritis, peripheral neuropathy and morbid obesity.  The 

patient previously underwent an attempt at a midfoot fusion by myself.  

Unfortunately the patient went on to a clinical and radiographic nonunion.  A 

computed tomography scan was obtained which showed nonunion of the midfoot 

joints.  There also multiple broken screws.  The patient had pain and swelling 

in her foot.  She requested revision surgery rather than nonoperative 

management.  I do lengthy discussion with the patient and the potential risks 

and complications of surgery including but not limited to risk of anesthesia, 

risk of superficial infection, risk of deep infection, risk of delayed wound 

healing, risk of superficial wound necrosis, risk of deep wound necrosis, risk 

of damage to local blood vessels or nerves, risk of nonunion of the fusion site

, risk of malunion of the fusion site, risk of broken hardware, risk of need 

for further surgery, risk of chronic pain, risk of chronic swelling, risk of 

surgery not meeting preoperative expectations, risk of postoperative medical 

complications and possibly loss of life or limb.  The patient voiced her 

understanding of this and provided her consent to go forward with surgery.


Operative Findings: 


Gross nonunion of the medial naviculocuneiform and second and third 

tarsometatarsal joints.  Partial union of the first tarsometatarsal joint. 

Metallosis surrounding both incisions with no signs of deep infection.


Description of Procedure: 


The patient was identified in preoperative holding and the correct foot was 

marked with my initials.  I reviewed the patient's sent form and answered all 

of her questions.  The patient was then brought back to the operating room by 

anesthesia.  She was positioned on the operating room table and a general 

anesthetic and preoperative antibiotics were administered.  The right leg was 

secured to the operating room table with foam and tape.  A tourniquet was 

applied to the proximal aspect of the left thigh.  All bony prominences were 

well-padded.  The patient's left leg was then prepped and draped in the 

standard sterile fashion.  Prior to starting surgery timeout was performed 

identifying the correct patient, operative extremity, and procedure.  The 

patient's leg was then elevated, exsanguinated with an Esmarch bandage, and the 

tourniquet was inflated to 250 mmHg.





I began by making an incision over the previous scar along the medial aspect of 

her foot.  Dissection was carried down carefully through the subcutaneous 

tissue.  The tissue over the plate was sharply incised longitudinally in line 

with the skin incision.  There was metallosis surrounding the plate.  A deep 

culture was taken of the tissue directly over the plate with a swab.  Some of 

the tissue with metallosis was sent as a tissue culture.  The plate was then 

exposed and removed.  On inspection of the medial foot there was a gross 

nonunion of the naviculocuneiform joint and a partial union of the first 

tarsometatarsal joint with bridging bone plantarly.  Both nonunion sites were 

debrided using a series of osteotomes and curettes until both bony sites had 

healthy-appearing and bleeding bone.  A 2.0 mm drill bit was used to perforate 

all exposed bony surfaces.  Attention was then turned to the dorsal incision 

over the mid foot.  Skin incision was made with a scalpel.  Dissection was 

carried down carefully to the subcutaneous tissue with tenotomy scissors.  The 

tissue over the plate was sharply incised.  Again there was diffuse signs of 

metallosis surrounding the plate and multiple broken screw heads.  The plate 

and screws were sequentially removed.  There was a gross nonunion of the second 

and third tarsometatarsal joint.  Both nonunion sites were debrided using a 

series of osteotomes and curettes until both bony sites had healthy-appearing 

and bleeding bone.  A 2.0 mm drill bit was used to perforate all exposed bony 

surfaces.  At this point I mixed crushed cancellus allograft with augment and 

granules on the back table.  This mixture of bone graft and augment was then 

packed at all nonunion sites.  I then proceeded to place a solid 3.5 mm lag 

screw across the medial column.  A stab incision was made over the dorsal 

aspect of the mid first metatarsal shaft.  A 3.5 mm drill bit was used to 

create a gliding hole in the proximal aspect of the first metatarsal and a 2.5 

mm drill bit was used to create a threaded hole in the middle cuneiform and 

navicular.  A fully threaded 3.5 mm screw was placed generating excellent 

compression with a good bite.  I then placed a medial column plate over the 

navicular, medial cuneiform and first metatarsal.  Nonlocking screws were 

placed in the navicular, medial cuneiform, and first metatarsal.  The remaining 

slots in the plate were then filled with locking screws.  Attention was then 

turned to the dorsal aspect of the foot.  There was decent bone so I elected to 

place solid 3.5 mm lag screws across both the second and third tarsometatarsal 

joint to generate compression.  A combination of 3.5 mm and 2.5 mm drill bits 

were used to create gliding and threaded holes respectively.  Fully threaded 

screws were placed across both the second and third tarsometatarsal joints.  

Remaining joint spaces were packed with allograft and augment.  Clinically all 

of the nonunion sites appeared to be adequately packed with bone graft.  All of 

the hardware appeared to have excellent purchase.  Final fluoroscopy shots were 

taken.  The deep subcutaneous layer over the hardware and fusion sites were 

closed with a running 0 Vicryl stitch.  The deep subcutaneous tissue was 

reapproximated using 2-0 Vicryl.  The skin was closed using 3-0 nylon.  I 

verified that all instrument, sponge, and sharp counts were correct.  Sterile 

dressings consisting of Betadine soaked Adaptic, 4 x 4, and web roll were 

applied.  The patient was taken out of the drapes and a well-padded bulky Levy 

splint was applied.  The patient was awoken from her anesthetic and transferred 

to PACU having found the procedure well.





Aren Paredes PA-C was required is a skilled assistant for patient positioning, 

surgical exposure, retraction, closure of wound, and application of dressing.





Plan: The patient is going to be admitted overnight for 2 doses of 

postoperative antibiotics and pain control.  She is remain strictly 

nonweightbearing on her left leg for 3 months.  We will check a vitamin D 

level.  The patient will get a bone stimulator.  She'll follow-up in the office 

in 2 weeks for splint removal and wound check.

## 2017-11-11 VITALS — DIASTOLIC BLOOD PRESSURE: 74 MMHG | SYSTOLIC BLOOD PRESSURE: 116 MMHG | TEMPERATURE: 98 F

## 2017-11-11 VITALS — HEART RATE: 64 BPM

## 2017-11-11 RX ADMIN — HYDROCODONE BITARTRATE AND ACETAMINOPHEN PRN EACH: 10; 325 TABLET ORAL at 06:12

## 2017-11-11 RX ADMIN — HYDROCODONE BITARTRATE AND ACETAMINOPHEN PRN EACH: 10; 325 TABLET ORAL at 01:09

## 2017-11-11 RX ADMIN — ASPIRIN 325 MG ORAL TABLET SCH MG: 325 PILL ORAL at 09:36

## 2017-11-11 RX ADMIN — POTASSIUM CHLORIDE SCH: 14.9 INJECTION, SOLUTION INTRAVENOUS at 05:25

## 2017-11-11 RX ADMIN — POTASSIUM CHLORIDE SCH: 14.9 INJECTION, SOLUTION INTRAVENOUS at 05:26

## 2017-11-11 RX ADMIN — SULFAMETHOXAZOLE AND TRIMETHOPRIM SCH EACH: 800; 160 TABLET ORAL at 09:36

## 2017-11-11 NOTE — FL
Fluoroscopy

 

HISTORY: Screw placement left foot

 

5 seconds fluoroscopy time supplied to the referring clinician.  3 intraoperative C-arm images docume
nt the procedure. See dictated report from orthopedic surgery.

## 2017-11-11 NOTE — XR
Limited left foot

 

HISTORY: Screw placement

 

3 intraoperative C-arm images document the procedure

## 2017-11-11 NOTE — P.PN
Subjective





Overall the patient is doing well with mild pain in her foot.  She denies 

slight shortness of breath which she attributes to anesthesia.  She denies 

chest pain, nausea, or vomiting





Objective





- Vital Signs


Vital signs: 


 Vital Signs











Temp  98.3 F   11/11/17 01:00


 


Pulse  64   11/11/17 01:00


 


Resp  16   11/11/17 01:00


 


BP  120/74   11/11/17 01:00


 


Pulse Ox  95   11/11/17 01:00








 Intake & Output











 11/10/17 11/11/17 11/11/17





 18:59 06:59 18:59


 


Intake Total 1821 1718 


 


Output Total 100  


 


Balance 1721 1718 


 


Intake:   


 


  IV 1821  


 


  Intake, IV Titration  1100 





  Amount   


 


    Lactated Ringers 1,000 ml  1000 





    @ 100 mls/hr IV .Q10H   





    HEATH Rx#:[id number]   


 


    ceFAZolin 3 gm In Sodium  100 





    Chloride 0.9% 100 ml @   





    100 mls/hr IVPB Q8H HEATH   





    Rx#:[id number]   


 


  Oral  618 


 


Output:   


 


  Estimated Blood Loss 100  


 


Other:   


 


  # Voids  2 














- Exam





On exam the patient is alert and oriented.  She is able to answer questions.  A 

focused examination of the operative leg was conducted.  On inspection the 

splint is clean-appearing with no drainage.  The tips of the toes are warm and 

well perfused with brisk capillary refill.





- Labs


Labs: 


 Microbiology - Last 24 Hours (Table)











 11/10/17 17:18 Gram Stain - Preliminary





 Foot - Left Wound Culture - Preliminary


 


 11/10/17 17:18 Tissue Culture - Preliminary





 Foot - Left 


 


 11/10/17 17:18 Anaerobic Culture - Preliminary





 Foot - Left 


 


 11/10/17 17:18 Anaerobic Culture - Preliminary





 Foot - Left 


 


 11/10/17 17:18 Anaerobic Culture - Preliminary





 Foot - Left 


 


 11/10/17 17:18 Anaerobic Culture - Preliminary





 Foot - Left 


 


 11/10/17 17:18 Tissue Culture - Preliminary





 Foot - Left 


 


 11/10/17 17:18 Wound Culture - Preliminary





 Foot - Left 














Assessment and Plan


Plan: 





Postop day #1 status post revision midfoot fusion, doing well.





1.  Strict nonweightbearing operative leg


2.  Keep splint clean, do not remove


3.  2 doses postoperative antibiotics


4.  DVT prophylaxis with Lovenox while in-house, home on aspirin


5.  25-hydroxy vitamin D level pending


6.  Low suspicion for deep infection, but we will follow cultures.  As a 

precaution the patient is going to be sent home on Bactrim double strength 

twice a day until cultures come back.


7.  Okay to discharge home this morning

## 2017-11-11 NOTE — P.DS
Providers


Attending physician: 


Valentin Mead





Primary care physician: 


[facility] Course: 





The patient is a 57-year-old female who was admitted for revision midfoot 

fusion.  She had an uncomplicated surgery and and was admitted overnight for 

pain control and IV antibiotics.  On postoperative day #1 she was doing well.  

She was cleared to discharge home from an orthopedic standpoint.





Plan - Discharge Summary


Discharge Rx Participant: Yes


New Discharge Prescriptions: 


New


   Sulfamethox-Tmp 800-160Mg [Bactrim -160 mg] 1 tab PO Q12HR #84 tab


   Aspirin 325 mg PO BID #60 tab


   Docusate [Colace] 100 mg PO BID #60 capsule





No Action


   Citalopram Hydrobromide [Citalopram HBr] 40 mg PO QAM


   Diltiazem Oral [Cardizem*] 15 mg PO BID


   Cholecalciferol [Vitamin D3] 2,000 unit PO DAILY


   Oxybutynin Chloride [Oxybutynin Chloride ER] 10 mg PO BID


   Aspirin [Adult Low Dose Aspirin EC] 81 mg PO DAILY


   HYDROcodone/APAP 10-325MG [Norco ] 1 tab PO Q4-6H PRN


     PRN Reason: Pain


Discharge Medication List





Citalopram Hydrobromide [Citalopram HBr] 40 mg PO QAM 01/08/15 [History]


Diltiazem Oral [Cardizem*] 15 mg PO BID 12/02/15 [History]


Aspirin [Adult Low Dose Aspirin EC] 81 mg PO DAILY 01/19/17 [History]


Cholecalciferol [Vitamin D3] 2,000 unit PO DAILY 01/19/17 [History]


Oxybutynin Chloride [Oxybutynin Chloride ER] 10 mg PO BID 01/19/17 [History]


HYDROcodone/APAP 10-325MG [Norco ] 1 tab PO Q4-6H PRN 02/28/17 [History]


Aspirin 325 mg PO BID #60 tab 11/10/17 [Rx]


Docusate [Colace] 100 mg PO BID #60 capsule 11/10/17 [Rx]


Sulfamethox-Tmp 800-160Mg [Bactrim -160 mg] 1 tab PO Q12HR #84 tab 11/10/

17 [Rx]








Follow up Appointment(s)/Referral(s): 


Valentin Mead MD [Medical Doctor] - 10 Days


Activity/Diet/Wound Care/Special Instructions: 


1. Strict nonweightbearing operative leg


2.  Leave splint in place


3.  Keep splint clean and dry


4.  Take pain medication as prescribed


5.  Follow-up in office in 2 weeks


Discharge Disposition: HOME SELF-CARE

## 2018-04-16 ENCOUNTER — HOSPITAL ENCOUNTER (INPATIENT)
Dept: HOSPITAL 47 - EC | Age: 58
LOS: 2 days | Discharge: HOME | DRG: 194 | End: 2018-04-18
Payer: MEDICARE

## 2018-04-16 VITALS — BODY MASS INDEX: 43.4 KG/M2

## 2018-04-16 DIAGNOSIS — M50.30: ICD-10-CM

## 2018-04-16 DIAGNOSIS — Z87.891: ICD-10-CM

## 2018-04-16 DIAGNOSIS — Z79.899: ICD-10-CM

## 2018-04-16 DIAGNOSIS — Z80.1: ICD-10-CM

## 2018-04-16 DIAGNOSIS — Z80.7: ICD-10-CM

## 2018-04-16 DIAGNOSIS — E66.01: ICD-10-CM

## 2018-04-16 DIAGNOSIS — J44.1: ICD-10-CM

## 2018-04-16 DIAGNOSIS — G40.909: ICD-10-CM

## 2018-04-16 DIAGNOSIS — Z80.3: ICD-10-CM

## 2018-04-16 DIAGNOSIS — J10.1: Primary | ICD-10-CM

## 2018-04-16 DIAGNOSIS — Z80.0: ICD-10-CM

## 2018-04-16 DIAGNOSIS — Z82.49: ICD-10-CM

## 2018-04-16 DIAGNOSIS — J44.0: ICD-10-CM

## 2018-04-16 DIAGNOSIS — E87.3: ICD-10-CM

## 2018-04-16 DIAGNOSIS — Z88.5: ICD-10-CM

## 2018-04-16 DIAGNOSIS — Z83.3: ICD-10-CM

## 2018-04-16 DIAGNOSIS — F41.9: ICD-10-CM

## 2018-04-16 DIAGNOSIS — G89.29: ICD-10-CM

## 2018-04-16 DIAGNOSIS — G47.33: ICD-10-CM

## 2018-04-16 DIAGNOSIS — M19.90: ICD-10-CM

## 2018-04-16 DIAGNOSIS — I48.0: ICD-10-CM

## 2018-04-16 DIAGNOSIS — Z79.82: ICD-10-CM

## 2018-04-16 LAB
ALBUMIN SERPL-MCNC: 4.2 G/DL (ref 3.5–5)
ALP SERPL-CCNC: 90 U/L (ref 38–126)
ALT SERPL-CCNC: 33 U/L (ref 9–52)
ANION GAP SERPL CALC-SCNC: 13 MMOL/L
APTT BLD: 24.5 SEC (ref 22–30)
AST SERPL-CCNC: 29 U/L (ref 14–36)
BASOPHILS # BLD AUTO: 0 K/UL (ref 0–0.2)
BASOPHILS NFR BLD AUTO: 0 %
BUN SERPL-SCNC: 12 MG/DL (ref 7–17)
CALCIUM SPEC-MCNC: 9.6 MG/DL (ref 8.4–10.2)
CHLORIDE SERPL-SCNC: 104 MMOL/L (ref 98–107)
CK SERPL-CCNC: 140 U/L (ref 30–135)
CO2 SERPL-SCNC: 26 MMOL/L (ref 22–30)
EOSINOPHIL # BLD AUTO: 0.3 K/UL (ref 0–0.7)
EOSINOPHIL NFR BLD AUTO: 5 %
ERYTHROCYTE [DISTWIDTH] IN BLOOD BY AUTOMATED COUNT: 4.67 M/UL (ref 3.8–5.4)
ERYTHROCYTE [DISTWIDTH] IN BLOOD: 12.9 % (ref 11.5–15.5)
GLUCOSE SERPL-MCNC: 100 MG/DL (ref 74–99)
HCT VFR BLD AUTO: 39.9 % (ref 34–46)
HGB BLD-MCNC: 13.6 GM/DL (ref 11.4–16)
INR PPP: 1.1 (ref ?–1.2)
LYMPHOCYTES # SPEC AUTO: 0.7 K/UL (ref 1–4.8)
LYMPHOCYTES NFR SPEC AUTO: 12 %
MAGNESIUM SPEC-SCNC: 1.6 MG/DL (ref 1.6–2.3)
MCH RBC QN AUTO: 29.2 PG (ref 25–35)
MCHC RBC AUTO-ENTMCNC: 34.1 G/DL (ref 31–37)
MCV RBC AUTO: 85.5 FL (ref 80–100)
MONOCYTES # BLD AUTO: 0.4 K/UL (ref 0–1)
MONOCYTES NFR BLD AUTO: 8 %
NEUTROPHILS # BLD AUTO: 4.2 K/UL (ref 1.3–7.7)
NEUTROPHILS NFR BLD AUTO: 73 %
PLATELET # BLD AUTO: 270 K/UL (ref 150–450)
POTASSIUM SERPL-SCNC: 4.1 MMOL/L (ref 3.5–5.1)
PROT SERPL-MCNC: 7.2 G/DL (ref 6.3–8.2)
PT BLD: 10.7 SEC (ref 9–12)
SODIUM SERPL-SCNC: 143 MMOL/L (ref 137–145)
TROPONIN I SERPL-MCNC: <0.012 NG/ML (ref 0–0.03)
WBC # BLD AUTO: 5.8 K/UL (ref 3.8–10.6)

## 2018-04-16 PROCEDURE — 36600 WITHDRAWAL OF ARTERIAL BLOOD: CPT

## 2018-04-16 PROCEDURE — 99285 EMERGENCY DEPT VISIT HI MDM: CPT

## 2018-04-16 PROCEDURE — 94640 AIRWAY INHALATION TREATMENT: CPT

## 2018-04-16 PROCEDURE — 71045 X-RAY EXAM CHEST 1 VIEW: CPT

## 2018-04-16 PROCEDURE — 83880 ASSAY OF NATRIURETIC PEPTIDE: CPT

## 2018-04-16 PROCEDURE — 85730 THROMBOPLASTIN TIME PARTIAL: CPT

## 2018-04-16 PROCEDURE — 85610 PROTHROMBIN TIME: CPT

## 2018-04-16 PROCEDURE — 96374 THER/PROPH/DIAG INJ IV PUSH: CPT

## 2018-04-16 PROCEDURE — 82550 ASSAY OF CK (CPK): CPT

## 2018-04-16 PROCEDURE — 93005 ELECTROCARDIOGRAM TRACING: CPT

## 2018-04-16 PROCEDURE — 71046 X-RAY EXAM CHEST 2 VIEWS: CPT

## 2018-04-16 PROCEDURE — 82553 CREATINE MB FRACTION: CPT

## 2018-04-16 PROCEDURE — 82805 BLOOD GASES W/O2 SATURATION: CPT

## 2018-04-16 PROCEDURE — 83605 ASSAY OF LACTIC ACID: CPT

## 2018-04-16 PROCEDURE — 94760 N-INVAS EAR/PLS OXIMETRY 1: CPT

## 2018-04-16 PROCEDURE — 71260 CT THORAX DX C+: CPT

## 2018-04-16 PROCEDURE — 87040 BLOOD CULTURE FOR BACTERIA: CPT

## 2018-04-16 PROCEDURE — 80053 COMPREHEN METABOLIC PANEL: CPT

## 2018-04-16 PROCEDURE — 83735 ASSAY OF MAGNESIUM: CPT

## 2018-04-16 PROCEDURE — 85379 FIBRIN DEGRADATION QUANT: CPT

## 2018-04-16 PROCEDURE — 36415 COLL VENOUS BLD VENIPUNCTURE: CPT

## 2018-04-16 PROCEDURE — 84484 ASSAY OF TROPONIN QUANT: CPT

## 2018-04-16 PROCEDURE — 94660 CPAP INITIATION&MGMT: CPT

## 2018-04-16 PROCEDURE — 85025 COMPLETE CBC W/AUTO DIFF WBC: CPT

## 2018-04-16 PROCEDURE — 87502 INFLUENZA DNA AMP PROBE: CPT

## 2018-04-16 RX ADMIN — HYDROCODONE BITARTRATE AND ACETAMINOPHEN PRN EACH: 10; 325 TABLET ORAL at 15:25

## 2018-04-16 RX ADMIN — ONDANSETRON HYDROCHLORIDE PRN MG: 4 TABLET, FILM COATED ORAL at 16:39

## 2018-04-16 RX ADMIN — ENOXAPARIN SODIUM SCH MG: 40 INJECTION SUBCUTANEOUS at 15:22

## 2018-04-16 RX ADMIN — DILTIAZEM HYDROCHLORIDE SCH MG: 30 TABLET, FILM COATED ORAL at 19:39

## 2018-04-16 RX ADMIN — CITALOPRAM HYDROBROMIDE SCH MG: 20 TABLET ORAL at 11:40

## 2018-04-16 RX ADMIN — IPRATROPIUM BROMIDE AND ALBUTEROL SULFATE SCH ML: .5; 3 SOLUTION RESPIRATORY (INHALATION) at 19:38

## 2018-04-16 RX ADMIN — DILTIAZEM HYDROCHLORIDE SCH: 30 TABLET, FILM COATED ORAL at 22:06

## 2018-04-16 RX ADMIN — ASPIRIN 81 MG CHEWABLE TABLET SCH MG: 81 TABLET CHEWABLE at 11:40

## 2018-04-16 RX ADMIN — HYDROCODONE BITARTRATE AND ACETAMINOPHEN PRN EACH: 10; 325 TABLET ORAL at 22:06

## 2018-04-16 RX ADMIN — DILTIAZEM HYDROCHLORIDE SCH MG: 30 TABLET, FILM COATED ORAL at 15:22

## 2018-04-16 RX ADMIN — OSELTAMIVIR PHOSPHATE SCH MG: 75 CAPSULE ORAL at 20:21

## 2018-04-16 RX ADMIN — IPRATROPIUM BROMIDE AND ALBUTEROL SULFATE SCH ML: .5; 3 SOLUTION RESPIRATORY (INHALATION) at 15:09

## 2018-04-16 RX ADMIN — IPRATROPIUM BROMIDE AND ALBUTEROL SULFATE SCH ML: .5; 3 SOLUTION RESPIRATORY (INHALATION) at 11:54

## 2018-04-16 NOTE — P.CNPUL
History of Present Illness


Consult date: 18


Reason for consult: dyspnea


History of present illness: 





This is a 57-year-old female patient, a employee of [employer]

, who came into the hospital yesterday because of increased dyspnea, cough, 

chest tightness and wheezing.  She was having fever and chills and she was 

feeling quite lethargic and weak.  Based on all this, she came into the 

hospital and the patient was found to have an acute influenza be taken 

bronchitis.  Chest x-ray was clear and there was a description of another 

density in the left lung base.  Pulmonary consultation was requested.  The 

patient is known to me.  Of seeing this patient in the office and based on my 

previous evaluations the patient had an FEV1 of 81% of predicted with no 

reversibility and no hyperinflation and a normal diffusion capacity.  I have 

This patient on a combination of Spiriva and albuterol rescue and had to use on 

an as-needed basis.  She was also diagnosed having moderate severe obstructive 

sleep apnea with an AHI of 15.  A previous CAT scan of the chest showed a tiny 

5 mm nodule in the right midlung as well as another nodule in the left lung 

base.  This was based on a CAT scan of the chest that was done in 2017.  

The patient has also previous evaluations by cardiology and she follows up with 

Dr. Saez.  She has paroxysmal atrial fibrillation.  She has issues with chronic 

pain related to cervical disc disease and she is morbidly obese.





Review of Systems








Constitutional


Constitutional: no fever, no night sweats, weight gain (13lbs), exercise 

intolerance





Eyes


Eyes: no dry eyes, no vision change, no irritation





ENMT


Ears: no difficulty hearing, no ear pain


Nose: no frequent nosebleeds, no nose problems, no sinus problems


Mouth/Throat: no sore throat, no bleeding gums, no snoring, no dry mouth, no 

mouth ulcers, no oral abnormalities, no teeth problems





Cardiovascular


Cardiovascular: no chest pain, no arm pain on exertion, no palpitations, no 

known heart murmur, shortness of breath when walking





Respiratory


Respiratory: shortness of breath (exertional dyspnea), in addition to cough, 

wheezing, and chest tightness





Gastrointestinal


Gastrointestinal: no abdominal pain, no nausea, no vomiting, no constipation, 

normal appetite, no diarrhea, not vomiting blood, no dyspepsia, no GERD





Genitourinary


Genitourinary: no incontinence, no difficulty urinating, no hematuria, no 

increased frequency





Musculoskeletal


Musculoskeletal: no muscle aches, no muscle weakness, no arthralgias/joint pain

, no back pain, no swelling in the extremities (neck pain)





Integumentary


Skin: no abnormal mole, no jaundice, no rashes, no laceration





Neurologic


Neurologic: weakness, numbness (cervical cervical disc disease, numbness and 

tingling and weakness in the upper and lower extremities bilaterally)





Psychiatric


Psych: no depression, no sleep disturbances, feeling safe in a relationship, no 

alcohol abuse, no anxiety, no hallucinations, no suicidal thoughts





Hematologic/Lymphatic


Hematologic/Lymphatic no swollen glands, no bruising, no excessive bleeding





Allergic/Immunologic


Allergy/Immunologic: no runny nose, no sinus pressure, no itching, no hives, no 

frequent sneezing





Past Medical History


Past Medical History: Atrial Fibrillation, COPD, Eye Disorder, Osteoarthritis (

OA), Seizure Disorder


Additional Past Medical History / Comment(s): Chronic bronchitis with an FEV1 

of 81% of predicted, obesity, obstructive sleep apnea with an AHI of 15, 

degenerative cervical disc disease, chronic pain, paroxysmal atrial fibrillation

, Neuropathy bilateral feet, L foot charcot arthropathy, history of hypoglycemia

, seizure at age, bilateral eye glaucoma, DDD, chronic low back pain, migraines

, overactive bladder, bilateral varicosities, hepatitis A thought to be from 

drinking from a water fountain in 5th grade.


History of Any Multi-Drug Resistant Organisms: None Reported


Past Surgical History:  Section, Cholecystectomy, Heart Catheterization

, Hysterectomy, Orthopedic Surgery, Tonsillectomy


Additional Past Surgical History / Comment(s): 2017 Cardiac cath at [facility] 

on [place], L foot arthroplasty of basal joint with revision, R knee tenton 

release, anterior cervical fusion C5-C6, L thumb arthroplasty, EGD/colonoscopy, 

hysterectomy with 1 ovary intact,  x 2, diagnostic laparoscopy, 

several cysts removed from back.


Past Anesthesia/Blood Transfusion Reactions: No Reported Reaction


Additional Past Anesthesia/Blood Transfusion Reaction / Comment(s): DAUGHTER = 

PONV


Smoking Status: Former smoker (This patient has quit smoking .  No cervical 

wasn't.  She carries a 2-pack-year smoking history.)





- Past Family History


  ** Sister(s)


Family Medical History: Cancer, Deep Vein Thrombosis (DVT)


Additional Family Medical History / Comment(s): HODGKINS LYMPHOMA, BREAST CA 

AND LUNG CA, ANOTHER SISTER BREAST CA





  ** Father


Family Medical History: Coronary Artery Disease (CAD), Diabetes Mellitus, 

Hyperlipidemia, Hypertension


Additional Family Medical History / Comment(s): bypass at age 80





  ** Mother


Family Medical History: Cancer, Osteoarthritis (OA)


Additional Family Medical History / Comment(s): ARTHRITIS,  FROM LIVER 

CANCER





Medications and Allergies


 Home Medications











 Medication  Instructions  Recorded  Confirmed  Type


 


Citalopram Hydrobromide 40 mg PO DAILY 01/08/15 04/16/18 History





[Citalopram HBr]    


 


Aspirin [Adult Low Dose Aspirin EC] 81 mg PO DAILY 17 History


 


Cholecalciferol [Vitamin D3] 5,000 unit PO DAILY 17 History


 


Diltiazem Oral [Cardizem Oral] 15 mg PO QID 18 History


 


HYDROcodone/APAP 10-325MG [Norco 1 tab PO Q6H PRN 18 History





]    


 


Mirabegron [Myrbetriq] 50 mg PO DAILY 18 History











 Allergies











Allergy/AdvReac Type Severity Reaction Status Date / Time


 


codeine Allergy  Anaphylaxis Verified 18 08:06














Physical Exam


Vitals: 


 Vital Signs











  Temp Pulse Resp BP Pulse Ox


 


 18 15:21   80   


 


 18 15:11   78   


 


 18 12:06   78   


 


 18 11:58   74   


 


 18 09:23   77  20  121/60  95


 


 18 08:58   80   


 


 18 08:50  97.6 F    


 


 18 08:48   76  18  


 


 18 08:03   74  16  133/67  97


 


 18 07:28   80   


 


 18 07:20   76  18  


 


 18 07:01  100.6 F H  88  24  225/108  96








 Intake and Output











 18





 06:59 14:59 22:59


 


Other:   


 


  Weight  145.15 kg 














Results








General Appearance no diaphoresis, no respiratory distress, speech not 

interrupted by breaths, no dyspnea, no pallor, not cachectic, well nourished, 

appears well, morbid obesity


HEENT no pursed lip breathing, no jugular venous distention, no mucous membrane 

cyanosis, no perioral cyanosis, mallampati classification: class 1, Mallampati 

Classification: Class 4


Chest no barrel chest, no retractions, no sternocleidomastoid muscle 

contractions, no supraclavicular retractions, no intercostal retractions, no 

decreased air movement, no rhonchi, no hyperinflation, prolonged expiratory 

wheezing, decreased air movement


Heart no right ventricular heave, no distant heart sounds, no s3 gallop


Extremities no cyanosis, no clubbing, no edema


Neurologic no decreased mental status, no somnolence, no confusion


GI bowel sounds: hyperactive (borborygmi), bowel sounds: diminished or absent


Neurologically patient is awake and alert and there is no focal neurological 

deficits.


Examination of the skin revealed no evidence of significant rashes, suspicious 

appearing nevi or other concerning lesions.





- Laboratory Findings


CBC and BMP: 


 18 07:12





 18 07:12


PT/INR, D-dimer











PT  10.7 sec (9.0-12.0)   18  07:12    


 


INR  1.1  (<1.2)   18  07:12    








Abnormal lab findings: 


 Abnormal Labs











  18





  07:12 07:12 07:12


 


Lymphocytes #   0.7 L 


 


Glucose    100 H


 


Total Creatine Kinase  140 H  


 


Influenza Type B (PCR)   














  18





  07:35


 


Lymphocytes # 


 


Glucose 


 


Total Creatine Kinase 


 


Influenza Type B (PCR)  Detected H














- Diagnostic Findings


Chest x-ray: image reviewed





Assessment and Plan


Plan: 





Assessment





1 acute influenza be taken bronchitis





2 acute exacerbation of chronic bronchitis secondary to influenza be 

bronchitis.  The patient has chronic bronchitis related to smoke and the patient

's FEV1 is normal at 81% of predicted





3 obstructive sleep apnea maintained on CPAP therapy





4 obesity with a BMI of 43.4





5 proximity to fibrillation current rhythm is sinus





6 degenerative cervical disc disease





7 chronic pain





8 remote history of seizure disorder





9 peripheral neuropathy with left foot Charcot arthropathy





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





11 chronic back pain





12 migraine





13 overactive bladder





Plan





The patient is very hesitant to take prednisone based on his history of 

cataracts and glaucoma.  We'll put her on a short course of prednisone burst 

taper.  Continue Tamiflu.  Continue DuoNeb about treatments around the clock.  

Lovenox DVT prophylaxis regimen.  We'll continue to follow.

## 2018-04-16 NOTE — XR
EXAMINATION TYPE: XR chest 2V

 

DATE OF EXAM: 4/16/2018

 

COMPARISON: Prior chest x-ray 6/30/2017

 

HISTORY: Difficulty breathing

 

TECHNIQUE:  Frontal and lateral views of the chest are obtained.

 

FINDINGS:  There is no pleural effusion or pneumothorax seen.  Question nodular density superimposed 
over a lower thoracic vertebral body on the lateral exam. The cardiac silhouette size is within dano
l limits. There are overlying cardiac leads. Postop changes noted to the cervical spine.  The osseous
 structures are intact. Increased AP diameter of the chest be indicative of underlying COPD.

 

IMPRESSION:  Difficult to exclude lower lobe lung nodule. Consider CT chest for better evaluation as 
indicated.

## 2018-04-16 NOTE — P.HPIM
History of Present Illness


H&P Date: 18


Chief Complaint: Fever, sore throat, generalized malaise





57-year-old female with past medical history of arthritis and 1 episode of A. 

fib status post conversion.  Patient presented with 1 day history of 

generalized malaise, generalized weakness, fever and sore throat she reported 

upper respiratory infection like symptoms however later that night she started 

having some shortness of breath associated with wheezing and felt congested 

this was all associated with productive cough of some flaky dark sputum she is 

complaining of feeling tired easily with exertion.  However she also admits 

that since her left foot surgery she hasn't been as active probably lost some 

of her stamina.


Patient recently had multiple surgeries on her left lower extremity and has 

been spending prolonged time just resting around her house doing nothing.  She 

currently still wearing boots in her left lower extremity.  She was not found 

to be hypoxic in the ER her EKG overall was unremarkable, however due to her 

persistently complaining of shortness of breath and report of dark spots in her 

sputum of start by doing a d-dimer and if that's positive then we'll go ahead 

and order a CT angiogram chest.





Review of Systems





Constitutional: Patient reports fever, denies chills, denies night sweating,  

denies significant weight changes


Eyes: Patient   denies visual changes, denies eye pain


ENT: Patient   denies ear pain, reports rhinorrhea, reports sore throat


Cardiovascular:  Patient   denies chest pain, reports exertional dyspnea, 

denies peripheral leg edema, denies orthopnea, denies paroxysmal nocturnal 

dyspnea


Respiratory: as per HPI


Gastrointestinal: Patient   denies diarrhea, denies constipation, denies nausea

, denies vomiting, denies abdominal pain


Genitourinary: Patient   denies dysuria, denies hematuria, reports overactive 

bladder, denies genital  lesions


Musculoskeletal: Patient   denies muscle pain, chronic low back pain


Psychiatric:  Patient   denies changes in mood or memory, denies suicidal 

ideation, denies anxiety


Endocrine:  Patient   denies heat intolerance, denies cold intolerance, denies 

excessive thirst, denies polyuria


Neurological: Patient   denies focal neurologic deficits, denies weakness, 

denies numbness, denies tingling


Hem/Lymphatic: Patient   denies bleeding tendency, denies bruising, denies 

swollen lymph glands


Allergic/Immun: Patient   denies recent allergic reactions


Skin: Patient   denies rashes, denies pruritis, denies ulcers




















Past Medical History


Past Medical History: Atrial Fibrillation, COPD, Eye Disorder, Osteoarthritis (

OA), Seizure Disorder


Additional Past Medical History / Comment(s): Neuropathy bilateral feet, L foot 

charcot arthropathy, hypoglycemia, seizure at age 15 d/t hypoglycemia, 

bilateral eye glaucoma, DDD, chronic low back pain, migraines, overactive 

bladder, bilateral varicosities, hepatitis A thought to be from drinking from a 

water fountain in 5th grade.


History of Any Multi-Drug Resistant Organisms: None Reported


Past Surgical History:  Section, Cholecystectomy, Heart Catheterization

, Hysterectomy, Orthopedic Surgery, Tonsillectomy


Additional Past Surgical History / Comment(s): 2017 Cardiac cath at [facility] 

on [place], L foot arthroplasty of basal joint with revision, R knee tenton 

release, anterior cervical fusion C5-C6, L thumb arthroplasty, EGD/colonoscopy, 

hysterectomy with 1 ovary intact,  x 2, diagnostic laparoscopy, 

several cysts removed from back.


Past Anesthesia/Blood Transfusion Reactions: No Reported Reaction


Additional Past Anesthesia/Blood Transfusion Reaction / Comment(s): DAUGHTER = 

PONV


Smoking Status: Former smoker





- Past Family History


  ** Sister(s)


Family Medical History: Cancer, Deep Vein Thrombosis (DVT)


Additional Family Medical History / Comment(s): HODGKINS LYMPHOMA, BREAST CA 

AND LUNG CA, ANOTHER SISTER BREAST CA





  ** Father


Family Medical History: Coronary Artery Disease (CAD), Diabetes Mellitus, 

Hyperlipidemia, Hypertension


Additional Family Medical History / Comment(s): bypass at age 80





  ** Mother


Family Medical History: Cancer, Osteoarthritis (OA)


Additional Family Medical History / Comment(s): ARTHRITIS,  FROM LIVER 

CANCER





Medications and Allergies


 Home Medications











 Medication  Instructions  Recorded  Confirmed  Type


 


Citalopram Hydrobromide 40 mg PO DAILY 01/08/15 04/16/18 History





[Citalopram HBr]    


 


Aspirin [Adult Low Dose Aspirin EC] 81 mg PO DAILY 17 History


 


Cholecalciferol [Vitamin D3] 5,000 unit PO DAILY 17 History


 


Diltiazem Oral [Cardizem Oral] 15 mg PO QID 18 History


 


HYDROcodone/APAP 10-325MG [Norco 1 tab PO Q6H PRN 18 History





]    


 


Mirabegron [Myrbetriq] 50 mg PO DAILY 18 History











 Allergies











Allergy/AdvReac Type Severity Reaction Status Date / Time


 


codeine Allergy  Anaphylaxis Verified 18 08:06














Physical Exam


Vitals: 


 Vital Signs











  Temp Pulse Resp BP Pulse Ox


 


 18 12:06   78   


 


 18 11:58   74   


 


 18 09:23   77  20  121/60  95


 


 18 08:58   80   


 


 18 08:50  97.6 F    


 


 18 08:48   76  18  


 


 18 08:03   74  16  133/67  97


 


 18 07:28   80   


 


 18 07:20   76  18  


 


 18 07:01  100.6 F H  88  24  225/108  96








 Intake and Output











 04/15/18 04/16/18 04/16/18





 22:59 06:59 14:59


 


Other:   


 


  Weight   145.15 kg














Constitutional:          No acute distress, conversant, pleasant


Eyes:      Anicteric sclerae, moist conjunctiva, no lid-lag


         Pupils equal round reactive to light





ENMT:      NC/AT


         Oropharynx clear, no erythema, or exudates





Neck:      Supple, FROM, no masses, or JVD


         No carotid bruits


         No thyromegaly





Lungs:      Clear to auscultation


         Clear to percussion


         Normal respiratory effort, no accessory muscle use 





Cardiovascular:      Heart regular in rate and rhythm, 


         No murmurs, gallops, or rubs


         No peripheral edema





Abdominal:       Soft


         Nontender, no guarding, rebound or rigidity


         Abdomen moving with respiration


         Normoactive bowel sounds


         No hepatomegaly, No splenomegaly


         No palpable mass 


         No abdominal wall hernia noted 





Skin:      Normal temperature, tone, texture, turgor


         No induration


         No subcutaneous nodules 


         No rash, lesions


         No ulcers





Extremities:      Left lower extremity is covered with special to put up to the 

knee 


         No digital cyanosis 


         No clubbing


         Pedal pulses intact and symmetrical


         Radial pulses intact and symmetrical 


         No calf tenderness 





Psychiatric:      Alert and oriented to person, place and time


         Appropriate affect


         fair judgment   


      


Neuro      Muscles Strength 5/5 in all 4 extremities 


         Sensation to light touch grossly present throughout


         Cranial nerves II-XII grossly intact


         No focal sensory deficits


Lymphatics:       no palpable cervical or supraclavicular , or inguinal lymph 

nodes  





Results


CBC & Chem 7: 


 18 07:12





 18 07:12


Labs: 


 Abnormal Lab Results - Last 24 Hours (Table)











  18 Range/Units





  07:12 07:12 07:12 


 


Lymphocytes #   0.7 L   (1.0-4.8)  k/uL


 


Glucose    100 H  (74-99)  mg/dL


 


Total Creatine Kinase  140 H    ()  U/L


 


Influenza Type B (PCR)     (Not Detectd)  














  18 Range/Units





  07:35 


 


Lymphocytes #   (1.0-4.8)  k/uL


 


Glucose   (74-99)  mg/dL


 


Total Creatine Kinase   ()  U/L


 


Influenza Type B (PCR)  Detected H  (Not Detectd)  














Thrombosis Risk Factor Assmnt





- Choose All That Apply


Any of the Below Risk Factors Present?: Yes


Each Factor Represents 1 point: Abnormal pulmonary function (COPD), Age 41-60 

years, Obesity (BMI >25), Varicose veins


Other Risk Factors: Yes


Each Risk Factor Represents 2 Points: Immobilizing plaster cast


Each Risk Factor Represents 3 Points: Family history of DVT/PE


Other congenital or acquired thrombophilia - If yes, enter type in comment: No


Thrombosis Risk Factor Assessment Total Risk Factor Score: 9


Thrombosis Risk Factor Assessment Level: High Risk





Assessment and Plan


Assessment: 





57-year-old female with history of arthritis, and one episode of A. fib in the 

past status post conversion.  Present hospital due to 1 day history of 

shortness of breath generalized malaise, fever and sore throat.  She was found 

to have influenza B positive in the ED and admitted for further care due to 

ongoing shortness of breath.  Patient has history of multiple surgeries to her 

left lower extremity recently and she has been wearing heart supportive boot 

for over a month now and spending most of her time sitting down or resting with 

minimal physical activity at home.  She also reported possible hemoptysis, 

despite her reports of such severe short of breath her chest x-ray was 

unremarkable, her EKG is unremarkable.  Consideration for ruling out acute 

pulmonary embolic due to her risk factors we'll start with blood work and that'

s positive I will perform CT angios the chest.


Plan: 





#Acute influenza B virus infection


Started on Tamiflu


Droplet precautions


Patient was counseled to consider prophylactic treatment of close family 

contacts at her house





#Acute onset shortness of breath this could be due to possible history of 

chronic bronchitis


Chest x-ray unremarkable


Patient is high risk for DVT and possible acute PE 


Check d-dimer


If above is positive we'll consider CT angios the chest


Patient started on systemic by mouth prednisone and DuoNeb's when necessary





#Incidental findings of possible lung nodules on the chest x-ray


I will await for results of d-dimer is positive patient will get a CT angiogram 

the chest to rule out PE 





#Arthritis


Pain control





#DVT prophylaxis


Heparin subcu 3 times a day


 


Surrogate decision-maker: Patient's daughter Adriana Harvey


CODE STATUS: No code


Discussed with: Patient, ER, RN


Anticipated discharge: 48-72 hours


Anticipated discharge place: Home


A total of 50 minutes were spent on the care of this complex patient more than 

50% of the time was spent in counseling and care coordination.

## 2018-04-16 NOTE — P.PN
Progress Note - Text


Progress Note Date: 04/16/18





D dimer came back very slightly elevated this could be in part due to recent 

surgery she had on her left LE. 


patient oxygen saturation is within normal limits on room air. Chest pain is 

resolved. 


patient had received CT chest with contrast , and I would avoid exposing her to 

another CT scan with contrast within less that 24 hours. 


continue to monitor the patient , and re evaluate tomorrow if there is still 

high suspicion for PE


patient seems to have had chronic pain, now pain is minimal, oxygen saturation 

is normal on room air. 


continue current management

## 2018-04-16 NOTE — ED
General Adult HPI





- General


Chief complaint: Shortness of Breath


Stated complaint: MARY


Time Seen by Provider: 18 07:06


Source: patient, RN notes reviewed


Mode of arrival: ambulatory


Limitations: no limitations





- History of Present Illness


Initial comments: 





57-year-old female presenting with worsening dyspnea over the past 48 hours.  

Patient does have history of COPD, former smoker.  She states she has had some 

rhinorrhea and mild cough.  She also had several episodes of nausea and 

vomiting.  Denies central chest pain.  States she is also had a headache.  She 

denies lower extremity swelling.  She complains of fever and chills.  Patient 

has no history of CAD, states she had a heart catheterization approximately one 

year ago and this was normal according to the patient.





- Related Data


 Home Medications











 Medication  Instructions  Recorded  Confirmed


 


Citalopram Hydrobromide 40 mg PO DAILY 01/08/15 04/16/18





[Citalopram HBr]   


 


Aspirin [Adult Low Dose Aspirin EC] 81 mg PO DAILY 17


 


Cholecalciferol [Vitamin D3] 5,000 unit PO DAILY 17


 


Diltiazem Oral [Cardizem Oral] 15 mg PO QID 18


 


Mirabegron [Myrbetriq] 50 mg PO DAILY 18











 Allergies











Allergy/AdvReac Type Severity Reaction Status Date / Time


 


codeine Allergy  Anaphylaxis Verified 18 08:06














Review of Systems


ROS Statement: 


Those systems with pertinent positive or pertinent negative responses have been 

documented in the HPI.





ROS Other: All systems not noted in ROS Statement are negative.





Past Medical History


Past Medical History: Atrial Fibrillation, COPD, Eye Disorder, Osteoarthritis (

OA), Seizure Disorder


Additional Past Medical History / Comment(s): neuropathy faye. feet, HEPATITIS A 

? FROM DRINKING FOUNTAIN IN 5TH GRADE, HX OF SEIZURE X1 (AGE 15 D/T HYPOGLYCEMIA

), VARICOSE VEINS, SOB WITH ACTIVITY., DDD., HYPOGLYCEMIC., O.A.B., STATES 

IRREGULAR HEART BEAT.


History of Any Multi-Drug Resistant Organisms: None Reported


Past Surgical History:  Section, Cholecystectomy, Heart Catheterization

, Hysterectomy, Orthopedic Surgery, Tonsillectomy


Additional Past Surgical History / Comment(s): CYST ON BACK, DIAGNOSTIC LAP EXAM

, HYSTERECTOMY LEFT WITH 1 OVARY. RT KNEE TENDON RELEASE.,  X 2, LEFT 

FOOT , ant. cervical fusion c-5 - c-6, LEFT THUMB JOINT., HEART CATH ([facility])


Past Anesthesia/Blood Transfusion Reactions: No Reported Reaction


Additional Past Anesthesia/Blood Transfusion Reaction / Comment(s): DAUGHTER = 

PONV


Past Psychological History: Anxiety, Depression, Panic Disorder


Smoking Status: Former smoker


Past Alcohol Use History: Rare


Past Drug Use History: Marijuana





- Past Family History


  ** Sister(s)


Family Medical History: Cancer, Deep Vein Thrombosis (DVT)


Additional Family Medical History / Comment(s): HODGEKINS LYMPHOMA, BREAST CA 

AND LUNG CA, ANOTHER SISTER BREAST CA





  ** Father


Family Medical History: Coronary Artery Disease (CAD), Diabetes Mellitus, 

Hyperlipidemia, Hypertension


Additional Family Medical History / Comment(s): bypass at age 80





  ** Mother


Family Medical History: Cancer


Additional Family Medical History / Comment(s): ARTHRITIS,  FROM LIVER 

CANCER





General Exam


Limitations: no limitations


General appearance: alert, in distress


Head exam: Present: atraumatic, normocephalic


Eye exam: Present: normal appearance, PERRL, EOMI


Neck exam: Present: normal inspection.  Absent: tenderness, meningismus


Respiratory exam: Present: accessory muscle use, decreased breath sounds.  

Absent: wheezes, rales


Cardiovascular Exam: Present: regular rate, normal rhythm


GI/Abdominal exam: Present: soft.  Absent: distended, tenderness


Extremities exam: Present: normal inspection, normal capillary refill.  Absent: 

pedal edema


Neurological exam: Present: alert, oriented X3


Psychiatric exam: Present: normal affect, normal mood


Skin exam: Present: warm, dry, intact.  Absent: cyanosis, diaphoretic





Course


 Vital Signs











  18





  07:01 07:20 07:28


 


Temperature 100.6 F H  


 


Pulse Rate 88 76 80


 


Respiratory 24 18 





Rate   


 


Blood Pressure 225/108  


 


O2 Sat by Pulse 96  





Oximetry   














  18





  08:03


 


Temperature 


 


Pulse Rate 74


 


Respiratory 16





Rate 


 


Blood Pressure 133/67


 


O2 Sat by Pulse 97





Oximetry 














- Reevaluation(s)


Reevaluation #1: 





18 08:27


On reevaluation, patient remains quite dyspneic, decreased air entry bilaterally

, good oxygen saturation.





EKG Findings





- EKG Comments:


EKG Findings:: EKG, normal sinus rhythm, left axis deviation, rate of 73, VA 

interval 196, QRS duration 86, , there is no ST segment elevation





Medical Decision Making





- Medical Decision Making





57-year-old female history of COPD presenting with cough and dyspnea as well as 

flulike symptoms.  Patient is influenza be positive.  Laboratory studies reveal 

normal CBC, normal electrolytes.  Troponin and BNP are negative.  Chest x-ray 

does show lower lobe nodule which the patient is aware of.  On reevaluation she 

remains dyspneic with decreased air entry.








Patient will be placed in observation for symptomatically treatment of COPD 

exacerbation secondary to influenza





Case discussed with Dr. Kc who will accept admission





- Lab Data


Result diagrams: 


 18 07:12





 18 07:12


 Lab Results











  18 Range/Units





  07:12 07:12 07:12 


 


WBC   5.8   (3.8-10.6)  k/uL


 


RBC   4.67   (3.80-5.40)  m/uL


 


Hgb   13.6   (11.4-16.0)  gm/dL


 


Hct   39.9   (34.0-46.0)  %


 


MCV   85.5   (80.0-100.0)  fL


 


MCH   29.2   (25.0-35.0)  pg


 


MCHC   34.1   (31.0-37.0)  g/dL


 


RDW   12.9   (11.5-15.5)  %


 


Plt Count   270   (150-450)  k/uL


 


Neutrophils %   73   %


 


Lymphocytes %   12   %


 


Monocytes %   8   %


 


Eosinophils %   5   %


 


Basophils %   0   %


 


Neutrophils #   4.2   (1.3-7.7)  k/uL


 


Lymphocytes #   0.7 L   (1.0-4.8)  k/uL


 


Monocytes #   0.4   (0-1.0)  k/uL


 


Eosinophils #   0.3   (0-0.7)  k/uL


 


Basophils #   0.0   (0-0.2)  k/uL


 


PT     (9.0-12.0)  sec


 


INR     (<1.2)  


 


APTT     (22.0-30.0)  sec


 


Sodium    143  (137-145)  mmol/L


 


Potassium    4.1  (3.5-5.1)  mmol/L


 


Chloride    104  ()  mmol/L


 


Carbon Dioxide    26  (22-30)  mmol/L


 


Anion Gap    13  mmol/L


 


BUN    12  (7-17)  mg/dL


 


Creatinine    0.64  (0.52-1.04)  mg/dL


 


Est GFR (CKD-EPI)AfAm    >90  (>60 ml/min/1.73 sqM)  


 


Est GFR (CKD-EPI)NonAf    >90  (>60 ml/min/1.73 sqM)  


 


Glucose    100 H  (74-99)  mg/dL


 


Plasma Lactic Acid Suhas     (0.7-2.0)  mmol/L


 


Calcium    9.6  (8.4-10.2)  mg/dL


 


Magnesium    1.6  (1.6-2.3)  mg/dL


 


Total Bilirubin    0.4  (0.2-1.3)  mg/dL


 


AST    29  (14-36)  U/L


 


ALT    33  (9-52)  U/L


 


Alkaline Phosphatase    90  ()  U/L


 


Total Creatine Kinase  140 H    ()  U/L


 


CK-MB (CK-2)  1.5    (0.0-2.4)  ng/mL


 


CK-MB (CK-2) Rel Index  1.1    


 


Troponin I  <0.012    (0.000-0.034)  ng/mL


 


NT-Pro-B Natriuret Pep     pg/mL


 


Total Protein    7.2  (6.3-8.2)  g/dL


 


Albumin    4.2  (3.5-5.0)  g/dL


 


Influenza Type A RNA     (Not Detectd)  


 


Influenza Type B (PCR)     (Not Detectd)  














  18 Range/Units





  07:12 07:12 07:12 


 


WBC     (3.8-10.6)  k/uL


 


RBC     (3.80-5.40)  m/uL


 


Hgb     (11.4-16.0)  gm/dL


 


Hct     (34.0-46.0)  %


 


MCV     (80.0-100.0)  fL


 


MCH     (25.0-35.0)  pg


 


MCHC     (31.0-37.0)  g/dL


 


RDW     (11.5-15.5)  %


 


Plt Count     (150-450)  k/uL


 


Neutrophils %     %


 


Lymphocytes %     %


 


Monocytes %     %


 


Eosinophils %     %


 


Basophils %     %


 


Neutrophils #     (1.3-7.7)  k/uL


 


Lymphocytes #     (1.0-4.8)  k/uL


 


Monocytes #     (0-1.0)  k/uL


 


Eosinophils #     (0-0.7)  k/uL


 


Basophils #     (0-0.2)  k/uL


 


PT    10.7  (9.0-12.0)  sec


 


INR    1.1  (<1.2)  


 


APTT    24.5  (22.0-30.0)  sec


 


Sodium     (137-145)  mmol/L


 


Potassium     (3.5-5.1)  mmol/L


 


Chloride     ()  mmol/L


 


Carbon Dioxide     (22-30)  mmol/L


 


Anion Gap     mmol/L


 


BUN     (7-17)  mg/dL


 


Creatinine     (0.52-1.04)  mg/dL


 


Est GFR (CKD-EPI)AfAm     (>60 ml/min/1.73 sqM)  


 


Est GFR (CKD-EPI)NonAf     (>60 ml/min/1.73 sqM)  


 


Glucose     (74-99)  mg/dL


 


Plasma Lactic Acid Suhas  1.5    (0.7-2.0)  mmol/L


 


Calcium     (8.4-10.2)  mg/dL


 


Magnesium     (1.6-2.3)  mg/dL


 


Total Bilirubin     (0.2-1.3)  mg/dL


 


AST     (14-36)  U/L


 


ALT     (9-52)  U/L


 


Alkaline Phosphatase     ()  U/L


 


Total Creatine Kinase     ()  U/L


 


CK-MB (CK-2)     (0.0-2.4)  ng/mL


 


CK-MB (CK-2) Rel Index     


 


Troponin I     (0.000-0.034)  ng/mL


 


NT-Pro-B Natriuret Pep   79   pg/mL


 


Total Protein     (6.3-8.2)  g/dL


 


Albumin     (3.5-5.0)  g/dL


 


Influenza Type A RNA     (Not Detectd)  


 


Influenza Type B (PCR)     (Not Detectd)  














  18 Range/Units





  07:35 


 


WBC   (3.8-10.6)  k/uL


 


RBC   (3.80-5.40)  m/uL


 


Hgb   (11.4-16.0)  gm/dL


 


Hct   (34.0-46.0)  %


 


MCV   (80.0-100.0)  fL


 


MCH   (25.0-35.0)  pg


 


MCHC   (31.0-37.0)  g/dL


 


RDW   (11.5-15.5)  %


 


Plt Count   (150-450)  k/uL


 


Neutrophils %   %


 


Lymphocytes %   %


 


Monocytes %   %


 


Eosinophils %   %


 


Basophils %   %


 


Neutrophils #   (1.3-7.7)  k/uL


 


Lymphocytes #   (1.0-4.8)  k/uL


 


Monocytes #   (0-1.0)  k/uL


 


Eosinophils #   (0-0.7)  k/uL


 


Basophils #   (0-0.2)  k/uL


 


PT   (9.0-12.0)  sec


 


INR   (<1.2)  


 


APTT   (22.0-30.0)  sec


 


Sodium   (137-145)  mmol/L


 


Potassium   (3.5-5.1)  mmol/L


 


Chloride   ()  mmol/L


 


Carbon Dioxide   (22-30)  mmol/L


 


Anion Gap   mmol/L


 


BUN   (7-17)  mg/dL


 


Creatinine   (0.52-1.04)  mg/dL


 


Est GFR (CKD-EPI)AfAm   (>60 ml/min/1.73 sqM)  


 


Est GFR (CKD-EPI)NonAf   (>60 ml/min/1.73 sqM)  


 


Glucose   (74-99)  mg/dL


 


Plasma Lactic Acid Suhas   (0.7-2.0)  mmol/L


 


Calcium   (8.4-10.2)  mg/dL


 


Magnesium   (1.6-2.3)  mg/dL


 


Total Bilirubin   (0.2-1.3)  mg/dL


 


AST   (14-36)  U/L


 


ALT   (9-52)  U/L


 


Alkaline Phosphatase   ()  U/L


 


Total Creatine Kinase   ()  U/L


 


CK-MB (CK-2)   (0.0-2.4)  ng/mL


 


CK-MB (CK-2) Rel Index   


 


Troponin I   (0.000-0.034)  ng/mL


 


NT-Pro-B Natriuret Pep   pg/mL


 


Total Protein   (6.3-8.2)  g/dL


 


Albumin   (3.5-5.0)  g/dL


 


Influenza Type A RNA  Not Detected  (Not Detectd)  


 


Influenza Type B (PCR)  Detected H  (Not Detectd)  














Disposition


Clinical Impression: 


 Acute exacerbation of chronic obstructive airways disease, Influenza





Disposition: ADMITTED AS IP TO THIS HOSP


Condition: Stable


Referrals: 


Adrian Abbott MD [Primary Care Provider] - 1-2 days


Decision to Admit Reason: Admit from EC


Decision Date: 18


Decision Time: 08:29

## 2018-04-16 NOTE — CT
EXAMINATION TYPE: CT chest w con

 

DATE OF EXAM: 4/16/2018

 

COMPARISON: Prior chest CT 6/8/2017, chest x-ray 4/16/2018 HISTORY: Trouble breathing and left lower 
lobe lung nodule

 

CT DLP: 908.0 mGycm

Automated exposure control for dose reduction was used.

 

CONTRAST: 

CT scan of the chest is performed with IV Contrast, patient injected with 100 mL of Isovue 300.

 

FINDINGS:

 

LUNGS: The lungs are grossly clear with the exception of some minimal basilar atelectatic change on t
he left, there is no concerning parenchymal mass or nodule identified, nodular density along the fiss
ure on the left is stable and subcentimeter in size, and the right middle lobe nodule is stable and l
ikely benign.   There is no pleural effusion or pneumothorax seen.  The tracheobronchial tree is cortes
nt.

 

MEDIASTINUM: There are no greater than 1 cm hilar or mediastinal lymph nodes. Coronary artery calcifi
cation is present. There are calcified right hilar nodes, subcarinal nodes  No pericardial effusion i
s seen.  

 

AORTA:  No additional significant abnormality is seen.

 

 

OTHER:  The liver is enlarged and shows low attenuation likely due to hepatic steatosis, patient is p
ost cholecystectomy. There may be a small hiatal hernia.

 

IMPRESSION:  Stable benign exam. Old granulomatous disease. Probable hepatic steatosis. Postop change
.

## 2018-04-17 VITALS — RESPIRATION RATE: 18 BRPM

## 2018-04-17 LAB
ALBUMIN SERPL-MCNC: 3.7 G/DL (ref 3.5–5)
ALP SERPL-CCNC: 75 U/L (ref 38–126)
ALT SERPL-CCNC: 31 U/L (ref 9–52)
ANION GAP SERPL CALC-SCNC: 11 MMOL/L
AST SERPL-CCNC: 25 U/L (ref 14–36)
BASOPHILS # BLD AUTO: 0 K/UL (ref 0–0.2)
BASOPHILS NFR BLD AUTO: 0 %
BUN SERPL-SCNC: 12 MG/DL (ref 7–17)
CALCIUM SPEC-MCNC: 9.5 MG/DL (ref 8.4–10.2)
CHLORIDE SERPL-SCNC: 105 MMOL/L (ref 98–107)
CO2 SERPL-SCNC: 25 MMOL/L (ref 22–30)
EOSINOPHIL # BLD AUTO: 0 K/UL (ref 0–0.7)
EOSINOPHIL NFR BLD AUTO: 0 %
ERYTHROCYTE [DISTWIDTH] IN BLOOD BY AUTOMATED COUNT: 4.12 M/UL (ref 3.8–5.4)
ERYTHROCYTE [DISTWIDTH] IN BLOOD: 13.2 % (ref 11.5–15.5)
GLUCOSE SERPL-MCNC: 108 MG/DL (ref 74–99)
HCT VFR BLD AUTO: 35.7 % (ref 34–46)
HGB BLD-MCNC: 12.2 GM/DL (ref 11.4–16)
LYMPHOCYTES # SPEC AUTO: 0.9 K/UL (ref 1–4.8)
LYMPHOCYTES NFR SPEC AUTO: 12 %
MCH RBC QN AUTO: 29.6 PG (ref 25–35)
MCHC RBC AUTO-ENTMCNC: 34.2 G/DL (ref 31–37)
MCV RBC AUTO: 86.5 FL (ref 80–100)
MONOCYTES # BLD AUTO: 0.6 K/UL (ref 0–1)
MONOCYTES NFR BLD AUTO: 8 %
NEUTROPHILS # BLD AUTO: 5.9 K/UL (ref 1.3–7.7)
NEUTROPHILS NFR BLD AUTO: 77 %
PLATELET # BLD AUTO: 267 K/UL (ref 150–450)
POTASSIUM SERPL-SCNC: 4.6 MMOL/L (ref 3.5–5.1)
PROT SERPL-MCNC: 6.6 G/DL (ref 6.3–8.2)
SODIUM SERPL-SCNC: 141 MMOL/L (ref 137–145)
WBC # BLD AUTO: 7.6 K/UL (ref 3.8–10.6)

## 2018-04-17 RX ADMIN — IPRATROPIUM BROMIDE AND ALBUTEROL SULFATE SCH: .5; 3 SOLUTION RESPIRATORY (INHALATION) at 19:34

## 2018-04-17 RX ADMIN — ASPIRIN 81 MG CHEWABLE TABLET SCH MG: 81 TABLET CHEWABLE at 07:57

## 2018-04-17 RX ADMIN — CITALOPRAM HYDROBROMIDE SCH MG: 20 TABLET ORAL at 07:56

## 2018-04-17 RX ADMIN — IPRATROPIUM BROMIDE AND ALBUTEROL SULFATE SCH ML: .5; 3 SOLUTION RESPIRATORY (INHALATION) at 07:23

## 2018-04-17 RX ADMIN — DILTIAZEM HYDROCHLORIDE SCH MG: 30 TABLET, FILM COATED ORAL at 17:59

## 2018-04-17 RX ADMIN — PROMETHAZINE HYDROCHLORIDE PRN MG: 6.25 SYRUP ORAL at 17:01

## 2018-04-17 RX ADMIN — PROMETHAZINE HYDROCHLORIDE PRN MG: 6.25 SYRUP ORAL at 09:49

## 2018-04-17 RX ADMIN — DILTIAZEM HYDROCHLORIDE SCH MG: 30 TABLET, FILM COATED ORAL at 12:50

## 2018-04-17 RX ADMIN — HYDROCODONE BITARTRATE AND ACETAMINOPHEN PRN EACH: 10; 325 TABLET ORAL at 21:28

## 2018-04-17 RX ADMIN — OSELTAMIVIR PHOSPHATE SCH MG: 75 CAPSULE ORAL at 07:56

## 2018-04-17 RX ADMIN — DILTIAZEM HYDROCHLORIDE SCH MG: 30 TABLET, FILM COATED ORAL at 21:25

## 2018-04-17 RX ADMIN — ENOXAPARIN SODIUM SCH MG: 40 INJECTION SUBCUTANEOUS at 07:56

## 2018-04-17 RX ADMIN — DILTIAZEM HYDROCHLORIDE SCH MG: 30 TABLET, FILM COATED ORAL at 07:59

## 2018-04-17 RX ADMIN — OSELTAMIVIR PHOSPHATE SCH MG: 75 CAPSULE ORAL at 21:31

## 2018-04-17 RX ADMIN — IPRATROPIUM BROMIDE AND ALBUTEROL SULFATE SCH ML: .5; 3 SOLUTION RESPIRATORY (INHALATION) at 15:47

## 2018-04-17 RX ADMIN — ONDANSETRON HYDROCHLORIDE PRN MG: 4 TABLET, FILM COATED ORAL at 12:42

## 2018-04-17 RX ADMIN — IPRATROPIUM BROMIDE AND ALBUTEROL SULFATE SCH ML: .5; 3 SOLUTION RESPIRATORY (INHALATION) at 19:27

## 2018-04-17 NOTE — P.PN
Subjective


Progress Note Date: 04/17/18


Principal diagnosis: 


 Acute influenza B tracheobronchitis





This is a 57-year-old female patient, a employee of [employer]

, who came into the hospital yesterday because of increased dyspnea, cough, 

chest tightness and wheezing.  She was having fever and chills and she was 

feeling quite lethargic and weak.  Based on all this, she came into the 

hospital and the patient was found to have an acute influenza be taken 

bronchitis.  Chest x-ray was clear and there was a description of another 

density in the left lung base.  Pulmonary consultation was requested.  The 

patient is known to me.  Of seeing this patient in the office and based on my 

previous evaluations the patient had an FEV1 of 81% of predicted with no 

reversibility and no hyperinflation and a normal diffusion capacity.  I have 

This patient on a combination of Spiriva and albuterol rescue and had to use on 

an as-needed basis.  She was also diagnosed having moderate severe obstructive 

sleep apnea with an AHI of 15.  A previous CAT scan of the chest showed a tiny 

5 mm nodule in the right midlung as well as another nodule in the left lung 

base.  This was based on a CAT scan of the chest that was done in June 2017.  

The patient has also previous evaluations by cardiology and she follows up with 

Dr. Saez.  She has paroxysmal atrial fibrillation.  She has issues with chronic 

pain related to cervical disc disease and she is morbidly obese.





On 04/17/2018 patient seen in follow-up on medical surgical unit.  States she 

still dyspneic, though improving.  Lung sounds today are less diminished, with 

only of minimal wheezes, air entry bilaterally.  She states she is feeling 

tired today, and is complaining of persistent nagging nonproductive cough.  

Patient is receiving promethazine, in addition to prednisone, bronchodilators, 

Tamiflu.  CT chest with contrast was obtained yesterday, and showed stable 

benign exam, old granulomatous disease, probable hepatic steatosis.  Patient 

remains stable, currently on room air with O2 sat at 93%, afebrile, vital signs 

are stable.  Continue with current medical treatment.








Objective





- Vital Signs


Vital signs: 


 Vital Signs











Temp  98.4 F   04/17/18 14:30


 


Pulse  70   04/17/18 15:56


 


Resp  18   04/17/18 14:30


 


BP  138/80   04/17/18 14:30


 


Pulse Ox  93 L  04/17/18 14:30








 Intake & Output











 04/16/18 04/17/18 04/17/18





 18:59 06:59 18:59


 


Intake Total 500 1150 


 


Balance 500 1150 


 


Weight 145.15 kg  


 


Intake:   


 


  Oral 500 1150 


 


Other:   


 


  # Voids 2 3 














- Exam


GENERAL EXAM: Alert, pleasant, 57-year-old white female, obese, comfortable in 

no apparent distress.


HEAD: Normocephalic/atraumatic.


EYES: Normal reaction of pupils, equal size.  Conjunctiva pink, sclera white.


NOSE: Clear with pink turbinates.


THROAT: No erythema or exudates.


NECK: No masses, no JVD, no thyroid enlargement, no adenopathy.


CHEST: No chest wall deformity.  Symmetrical expansion. 


LUNGS: Equal air entry with scattered wheezes, overall less diminished compared 

to previous exam, better air entry noted bilaterally


CVS: Regular rate and rhythm, normal S1 and S2, no gallops, no murmurs, no rubs


ABDOMEN: Soft, nontender.  No hepatosplenomegaly, normal bowel sounds, no 

guarding or rigidity.


EXTREMITIES: No clubbing, no edema, no cyanosis, 2+ pulses and upper and lower 

extremities.  Patient has a left lower extremity boot


MUSCULOSKELETAL: Muscle strength and tone normal.


SPINE: No scoliosis or deformity


SKIN: No rashes


CENTRAL NERVOUS SYSTEM: Alert and oriented -3.  No focal deficits, tone is 

normal in all 4 extremities.


PSYCHIATRIC: Alert and oriented -3.  Appropriate affect.  Intact judgment and 

insight.











- Labs


CBC & Chem 7: 


 04/17/18 07:18





 04/17/18 07:18


Labs: 


 Abnormal Lab Results - Last 24 Hours (Table)











  04/17/18 04/17/18 Range/Units





  07:18 07:18 


 


Lymphocytes #  0.9 L   (1.0-4.8)  k/uL


 


Glucose   108 H  (74-99)  mg/dL








 Microbiology - Last 24 Hours (Table)











 04/16/18 07:12 Blood Culture - Preliminary





 Blood    No Growth after 24 hours














Assessment and Plan


Plan: 


Assessment:





1 acute influenza be taken bronchitis





2 acute exacerbation of chronic bronchitis secondary to influenza be 

bronchitis.  The patient has chronic bronchitis related to smoke and the patient

's FEV1 is normal at 81% of predicted





3 obstructive sleep apnea maintained on CPAP therapy





4 obesity with a BMI of 43.4





5 proximity to fibrillation current rhythm is sinus





6 degenerative cervical disc disease





7 chronic pain





8 remote history of seizure disorder





9 peripheral neuropathy with left foot Charcot arthropathy





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





11 chronic back pain





12 migraine





13 overactive bladder





Plan:





Continue current medical treatment, continue prednisone 40 mg daily, Vicodin 

liters, Tamiflu, GI and DVT prophylaxis.  Overall patient is improving, less 

dyspneic and bronchospastic on today's exam.  CT chest was reviewed, showed old 

granulomatous disease, no acute findings.  Continue to follow





I performed a history & physical examination of the patient and discussed their 

management with my nurse practitioner, Sindy Peng.  I reviewed the nurse 

practitioner's note and agree with the documented findings and plan of care.  

Lung sounds are positive for a few scattered wheezes.  The findings and the 

impression was discussed with the patient.  I attest to the documentation by 

the nurse practitioner. 





Time with Patient: Less than 30

## 2018-04-17 NOTE — P.PN
Subjective


Progress Note Date: 04/17/18


Principal diagnosis: 





Shortness of breath





patient is still having cough and shortness of breath with exertion.  She did 

not sleep well last night.  She is still having significant nausea especially 

when she eats.





Objective





- Vital Signs


Vital signs: 


 Vital Signs











Temp  97.8 F   04/17/18 07:30


 


Pulse  68   04/17/18 07:37


 


Resp  18   04/17/18 07:30


 


BP  130/71   04/17/18 07:30


 


Pulse Ox  98   04/17/18 07:30








 Intake & Output











 04/16/18 04/17/18 04/17/18





 18:59 06:59 18:59


 


Intake Total 500 1150 


 


Balance 500 1150 


 


Weight 145.15 kg  


 


Intake:   


 


  Oral 500 1150 


 


Other:   


 


  # Voids 2 3 














- Exam





Constitutional: No acute distress, conversant, pleasant


Eyes:Anicteric sclerae, moist conjunctiva, no lid-lag, PERRLA, 


ENMT: Oropharynx clear, no erythema, exudates


Neck: Supple, FROM, no masses, or JVD, No carotid bruits, No thyromegaly


Lungs: Expiratory wheezes especially with cough, Clear to percussion, Normal 

respiratory effort, no accessory muscle use 


Cardiovascular: Heart regular in rate and rhythm, No murmurs, gallops, or rubs, 

No peripheral edema


Abdominal: Soft, Nontender, no guarding, rebound or rigidity, Normoactive bowel 

sounds, No hepatomegaly, No splenomegaly, No palpable mass 


Skin: Normal temperature, tone, texture, turgor, no induration, No subcutaneous 

nodules, No rash, lesions, No ulcers


Extremities: No digital cyanosis, No clubbing, Pedal pulses intact and 

symmetrical, Radial pulses intact and symmetrical, No calf tenderness 


Psychiatric: Alert and oriented to person, place and time, appropriate affect, 

intact judgement         


Neuro: Muscles Strength 5/5 in all 4 extremities, Sensation to light touch 

grossly present throughout, Cranial nerves II-XII grossly intact, no focal 

sensory deficits








- Labs


CBC & Chem 7: 


 04/17/18 07:18





 04/17/18 07:18


Labs: 


 Abnormal Lab Results - Last 24 Hours (Table)











  04/16/18 04/17/18 04/17/18 Range/Units





  15:03 07:18 07:18 


 


Lymphocytes #   0.9 L   (1.0-4.8)  k/uL


 


D-Dimer  0.97 H    (<0.60)  mg/L FEU


 


Glucose    108 H  (74-99)  mg/dL








 Microbiology - Last 24 Hours (Table)











 04/16/18 07:12 Blood Culture - Preliminary





 Blood    No Growth after 24 hours














Assessment and Plan


Plan: 





#Acute influenza B virus infection


Continue Tamiflu


Droplet precautions





#Acute on chronic bronchitis


Continue treatment with prednisone and DuoNeb's


Pulmonary embolus is unlikely as patient is not hypoxic, is wheezing on exam 

and the d-dimer elevation is likely nonspecific secondary to the bronchitis and 

recent surgery.





#Insomnia:


Ambien when necessary





#Arthritis


Pain control

## 2018-04-18 VITALS — TEMPERATURE: 97.8 F | DIASTOLIC BLOOD PRESSURE: 82 MMHG | SYSTOLIC BLOOD PRESSURE: 147 MMHG

## 2018-04-18 VITALS — HEART RATE: 74 BPM

## 2018-04-18 LAB
CO2 BLDA-SCNC: 26 MMOL/L (ref 19–24)
HCO3 BLDA-SCNC: 25 MMOL/L (ref 21–25)
PCO2 BLDA: 29 MMHG (ref 35–45)
PH BLDA: 7.54 [PH] (ref 7.35–7.45)
PO2 BLDA: 97 MMHG (ref 83–108)

## 2018-04-18 RX ADMIN — OSELTAMIVIR PHOSPHATE SCH MG: 75 CAPSULE ORAL at 09:04

## 2018-04-18 RX ADMIN — DILTIAZEM HYDROCHLORIDE SCH MG: 30 TABLET, FILM COATED ORAL at 09:04

## 2018-04-18 RX ADMIN — IPRATROPIUM BROMIDE AND ALBUTEROL SULFATE SCH ML: .5; 3 SOLUTION RESPIRATORY (INHALATION) at 07:46

## 2018-04-18 RX ADMIN — IPRATROPIUM BROMIDE AND ALBUTEROL SULFATE SCH: .5; 3 SOLUTION RESPIRATORY (INHALATION) at 12:24

## 2018-04-18 RX ADMIN — ENOXAPARIN SODIUM SCH MG: 40 INJECTION SUBCUTANEOUS at 09:04

## 2018-04-18 RX ADMIN — ASPIRIN 81 MG CHEWABLE TABLET SCH MG: 81 TABLET CHEWABLE at 09:04

## 2018-04-18 RX ADMIN — CITALOPRAM HYDROBROMIDE SCH MG: 20 TABLET ORAL at 09:04

## 2018-04-18 RX ADMIN — HYDROCODONE BITARTRATE AND ACETAMINOPHEN PRN EACH: 10; 325 TABLET ORAL at 09:03

## 2018-04-18 RX ADMIN — DILTIAZEM HYDROCHLORIDE SCH MG: 30 TABLET, FILM COATED ORAL at 13:43

## 2018-04-18 NOTE — XR
EXAMINATION TYPE: XR chest 1V portable

 

DATE OF EXAM: 4/18/2018

 

COMPARISON: 4/16/2018

 

HISTORY: Short of breath

 

TECHNIQUE: Single frontal view of the chest is obtained.

 

FINDINGS:  Heart and mediastinum are normal. Lungs are clear. Diaphragm is normal. There is no heart 
failure. Cervical spine fusion surgery is noted.

 

IMPRESSION:  No active cardiopulmonary disease. No change.

## 2018-04-18 NOTE — P.PN
Subjective


Progress Note Date: 04/18/18


Principal diagnosis: 


 Acute influenza B tracheobronchitis





This is a 57-year-old female patient, a employee of [employer]

, who came into the hospital yesterday because of increased dyspnea, cough, 

chest tightness and wheezing.  She was having fever and chills and she was 

feeling quite lethargic and weak.  Based on all this, she came into the 

hospital and the patient was found to have an acute influenza be taken 

bronchitis.  Chest x-ray was clear and there was a description of another 

density in the left lung base.  Pulmonary consultation was requested.  The 

patient is known to me.  Of seeing this patient in the office and based on my 

previous evaluations the patient had an FEV1 of 81% of predicted with no 

reversibility and no hyperinflation and a normal diffusion capacity.  I have 

This patient on a combination of Spiriva and albuterol rescue and had to use on 

an as-needed basis.  She was also diagnosed having moderate severe obstructive 

sleep apnea with an AHI of 15.  A previous CAT scan of the chest showed a tiny 

5 mm nodule in the right midlung as well as another nodule in the left lung 

base.  This was based on a CAT scan of the chest that was done in June 2017.  

The patient has also previous evaluations by cardiology and she follows up with 

Dr. Saez.  She has paroxysmal atrial fibrillation.  She has issues with chronic 

pain related to cervical disc disease and she is morbidly obese.





On 04/17/2018 patient seen in follow-up on medical surgical unit.  States she 

still dyspneic, though improving.  Lung sounds today are less diminished, with 

only of minimal wheezes, air entry bilaterally.  She states she is feeling 

tired today, and is complaining of persistent nagging nonproductive cough.  

Patient is receiving promethazine, in addition to prednisone, bronchodilators, 

Tamiflu.  CT chest with contrast was obtained yesterday, and showed stable 

benign exam, old granulomatous disease, probable hepatic steatosis.  Patient 

remains stable, currently on room air with O2 sat at 93%, afebrile, vital signs 

are stable.  Continue with current medical treatment.





On 04/18/2018 patient seen in follow-up.  Last night she had a episode of 

shortness of breath, patient was complaining of upper airway wheezing, and 

constriction, stat blood gas was obtained and showed pO2 of 97, pCO2 of 29, pH 

of 7.54, this is consistent with respiratory alkalosis related to 

hyperventilation secondary to anxiety.  T-max last night was 100.6F at 2045, 

afebrile on today's exam.  Lung sounds are clear to auscultation, there is 

minimal wheezing in the upper airways, patient is very anxious, and emotional.  

The patient stated her upper airway constriction has been going on for close to 

a year, and is not of acute new finding related to this episode of influenza 

infection.  Overall she remains stable.  Her coughing has subsided, she is not 

bringing up any sputum.  She has been treated with Tamiflu, oral prednisone, 

with improvement of her coughing, and wheezing.  She is very reluctant to 

continue on steroids, related to her concerns of bilateral cataracts due to 

chronic prednisone use.  At this time we will stop the prednisone, patient has 

improved with a few doses of oral prednisone, no need for antibiotic, patient 

does not sound congested, no rhonchi, no wheezes in the lower airways 

auscultated, good air entry noted bilaterally, good air movements.  Patient is 

stable for discharge from  pulmonary standpoint.  Chest CT from 04/16/2018 has 

been reviewed, and showed stable benign exam. 





Objective





- Vital Signs


Vital signs: 


 Vital Signs











Temp  97.8 F   04/18/18 07:00


 


Pulse  74   04/18/18 07:56


 


Resp  18   04/18/18 07:00


 


BP  147/82   04/18/18 07:00


 


Pulse Ox  94 L  04/18/18 07:00








 Intake & Output











 04/17/18 04/18/18 04/18/18





 18:59 06:59 18:59


 


Other:   


 


  Voiding Method  Toilet 


 


  # Voids 5 2 














- Exam


GENERAL EXAM: Alert, pleasant, 57-year-old white female, obese, comfortable in 

no apparent distress.


HEAD: Normocephalic/atraumatic.


EYES: Normal reaction of pupils, equal size.  Conjunctiva pink, sclera white.


NOSE: Clear with pink turbinates.


THROAT: No erythema or exudates.


NECK: No masses, no JVD, no thyroid enlargement, no adenopathy.


CHEST: No chest wall deformity.  Symmetrical expansion. 


LUNGS: Equal air entry with minimal wheeze in the upper airway near the throat, 

overall good air movement noted in bilateral lungs, no wheezing noted over 

lower airways, no rhonchi, no rales


CVS: Regular rate and rhythm, normal S1 and S2, no gallops, no murmurs, no rubs


ABDOMEN: Soft, nontender.  No hepatosplenomegaly, normal bowel sounds, no 

guarding or rigidity.


EXTREMITIES: No clubbing, no edema, no cyanosis, 2+ pulses and upper and lower 

extremities.  Patient has a left lower extremity boot


MUSCULOSKELETAL: Muscle strength and tone normal.


SPINE: No scoliosis or deformity


SKIN: No rashes


CENTRAL NERVOUS SYSTEM: Alert and oriented -3.  No focal deficits, tone is 

normal in all 4 extremities.


PSYCHIATRIC: Alert and oriented -3.  Tearful, anxious, but does console with 

reassurance.  Intact judgment and insight.











- Labs


CBC & Chem 7: 


 04/17/18 07:18





 04/17/18 07:18


Labs: 


 Abnormal Lab Results - Last 24 Hours (Table)











  04/18/18 Range/Units





  01:30 


 


ABG pH  7.54 H  (7.35-7.45)  


 


ABG pCO2  29 L  (35-45)  mmHg


 


ABG Total CO2  26 H  (19-24)  mmol/L


 


ABG O2 Saturation  97.8 H  (94-97)  %








 Microbiology - Last 24 Hours (Table)











 04/16/18 07:12 Blood Culture - Preliminary





 Blood    No Growth after 48 hours














Assessment and Plan


Plan: 


Assessment:





1 acute influenza tracheobronchitis





2 acute exacerbation of chronic bronchitis secondary to influenza be 

bronchitis.  The patient has chronic bronchitis related to smoke and the patient

's FEV1 is normal at 81% of predicted





3 obstructive sleep apnea maintained on CPAP therapy





4 obesity with a BMI of 43.4





5 proximity to fibrillation current rhythm is sinus





6 degenerative cervical disc disease





7 chronic pain





8 remote history of seizure disorder





9 peripheral neuropathy with left foot Charcot arthropathy





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





11 chronic back pain





12 migraine





13 overactive bladder





Plan:





Overall patient is much improved, less coughing, only minimal wheezing in the 

upper airways, but there is no physical evidence to suggest any obstruction or 

any upper airway abnormality based on the physical exam, good air entry noted 

bilaterally in the lower airways.  Patient states her throat constriction 

feeling has been going on for approximately a year, and this can be followed up 

on with her PCP on an outpatient basis.  No evidence of respiratory distress, 

overall patient has improved, is stable.  She is very reluctant to continue on 

prednisone in regards to her cataracts.  At this point the prednisone will be 

discontinued, no need for prednisone taper, patient will complete the Tamiflu 

treatment, and patient is stable for discharge home today.  





I performed a history & physical examination of the patient and discussed their 

management with my nurse practitioner, Sindy Peng.  I reviewed the nurse 

practitioner's note and agree with the documented findings and plan of care.  

Lung sounds are clear, minimal wheeze in the upper airway, overall better air 

entry noted bilaterally, patient is stable, no evidence of any respiratory 

distress, signs are stable.  The findings and the impression was discussed with 

the patient.  I attest to the documentation by the nurse practitioner. 





Time with Patient: Less than 30

## 2018-04-18 NOTE — P.DS
Providers


Date of admission: 


04/16/18 08:31





Expected date of discharge: 04/18/18


Attending physician: 


Lavonne Kc DO





Consults: 





 





04/16/18 08:31


Consult Physician Routine 


   Consulting Provider: Unruly Palmer


   Consult Reason/Comments: COPD, FLU


   Do you want consulting provider notified?: Yes











Primary care physician: 


[facility]: 





57-year-old female with past medical history of arthritis and 1 episode of A. 

fib status post conversion presented with 1 day history of generalized malaise, 

generalized weakness, fever and sore throat. Associated symptoms include some 

shortness of breath associated with wheezing and feeling of congestion, 

productive cough of some flaky dark sputum, as well as feeling tired easily 

with exertion.  Of note several month ago patient had a left foot surgery and 

since his surgery she has not been very active. She currently is still wearing 

boots in her left lower extremity.  





In the ER she was not found to be hypoxic, she was slightly febrile with 

temperature of 100.6, rest of her vital signs were all within normal limits, 

her EKG overall was unremarkable.  Patient was admitted to the hospital 

subsequently for further evaluation and management.  She had a computed 

tomography scan of the chest and that did not show any acute pneumonia.  Her d-

dimer was slightly elevated but her Well's score was 1.5 and that did not 

warrant further investigation for PE.  She does have positive for influenza B 

and was started on Tamiflu.  She was also treated with some DuoNeb due to some 

wheezing on exam when she initially presented.  Later in the hospitalization 

wheezing resolved.





Throughout the hospitalization patient was very concerned that her throat was 

becoming "constricted'', not congested according to the patient's word.  Upon 

examining her throat she did not have any significant erythema, purulent 

material or any enlargement in the tonsils.  Patient was reassured that her 

feeling was not due to any serious physical finding.  Patient kept asking for 

ENT evaluation.  I told her that I do not think the ENT evaluation is warranted 

at this point.  After she was told that she became very tearful, asked me to 

leave the room and asked to go home.  I relayed situation to the  

who talked to the patient.  Patient was seen in consultation with pulmonary 

service, recommendation is to discharge the patient home with prescription for 

Tamiflu.  DuoNeb's on steroids were not recommended.





Discharge diagnoses:


Acute influenza bronchitis


Acute anxiety, possible hypochondriasis


History of osteoarthritis and seizure disorder





Time for discharge 35 minutes.





Patient Condition at Discharge: Stable





Plan - Discharge Summary


Discharge Rx Participant: Yes


New Discharge Prescriptions: 


New


   Acetaminophen Tab [Tylenol] 650 mg PO Q6HR PRN  tab


     PRN Reason: Fever and/ or Mild Pain


   Oseltamivir [Tamiflu] 75 mg PO Q12HR 2 Days #4 cap





Continue


   Citalopram Hydrobromide [Citalopram HBr] 40 mg PO DAILY


   Cholecalciferol [Vitamin D3] 5,000 unit PO DAILY


   Aspirin [Adult Low Dose Aspirin EC] 81 mg PO DAILY


   Mirabegron [Myrbetriq] 50 mg PO DAILY


   Diltiazem Oral [Cardizem*] 15 mg PO QID


   HYDROcodone/APAP 10-325MG [Norco ] 1 tab PO Q6H PRN


     PRN Reason: Pain


Discharge Medication List





Citalopram Hydrobromide [Citalopram HBr] 40 mg PO DAILY 01/08/15 [History]


Aspirin [Adult Low Dose Aspirin EC] 81 mg PO DAILY 01/19/17 [History]


Cholecalciferol [Vitamin D3] 5,000 unit PO DAILY 01/19/17 [History]


Diltiazem Oral [Cardizem*] 15 mg PO QID 04/16/18 [History]


HYDROcodone/APAP 10-325MG [Norco ] 1 tab PO Q6H PRN 04/16/18 [History]


Mirabegron [Myrbetriq] 50 mg PO DAILY 04/16/18 [History]


Acetaminophen Tab [Tylenol] 650 mg PO Q6HR PRN  tab 04/18/18 [Rx]


Oseltamivir [Tamiflu] 75 mg PO Q12HR 2 Days #4 cap 04/18/18 [Rx]








Follow up Appointment(s)/Referral(s): 


Adrian Abbott MD [Primary Care Provider] - 04/26/18 2:40 pm


Patient Instructions/Handouts:  Oseltamivir (By mouth), Influenza (DC), COPD (

Chronic Obstructive Pulmonary Disease) (DC)


Activity/Diet/Wound Care/Special Instructions: 


pt would like med RX at time of d/c.

## 2018-04-18 NOTE — P.PN
Progress Note - Text


Progress Note Date: 04/17/18 (Late entry note)





patient was seen and examined, c/o SOB and upper chest congestion, no CP or 

orthopna, throat tingling or pain. On exam appeared anxious, lungs with normal 

breath souds and exp wheezes, CVS RRR s1 s2, abdomen soft non tendr, tarce leg 

edema, no erythema or pain


Nebs, Solu medrol and Lasix given, ABG consistent with hyperventilation. CXR 

stable. BIPAP placed rather for BINH than for SOB. Xanax given and dyspnea 

resolved after.

## 2018-08-07 ENCOUNTER — HOSPITAL ENCOUNTER (OUTPATIENT)
Dept: HOSPITAL 47 - RADMAMWWP | Age: 58
Discharge: HOME | End: 2018-08-07
Payer: MEDICARE

## 2018-08-07 DIAGNOSIS — Z12.31: Primary | ICD-10-CM

## 2018-08-07 PROCEDURE — 77063 BREAST TOMOSYNTHESIS BI: CPT

## 2018-08-07 PROCEDURE — 77067 SCR MAMMO BI INCL CAD: CPT

## 2018-08-08 NOTE — MM
Reason for exam: screening  (asymptomatic).

Last mammogram was performed 1 year and 1 month ago.



History:

Family history of breast cancer in sister at age 30 and breast cancer in sister at

age 48.

Benign ultrasound-guided core biopsy of the right breast, 2004.



Physical Findings:

A clinical breast exam by your physician is recommended on an annual basis and 

results should be correlated with mammographic findings.



MG 3D Screening Mammo W/Cad

Bilateral CC and MLO view(s) were taken.

Prior study comparison: July 19, 2017, bilateral MG screening mammo w CAD.  July 13, 2016, bilateral MG screening mammo w CAD.

There are scattered fibroglandular densities.  There is benign appearing round 

calcifications bilaterally. There is chronic nodularity bilaterally, greater in 

the right breast. Asymmetric breast tissue left upper quadrant, stable. There is 

no discrete abnormality.





ASSESSMENT: Benign, BI-RAD 2



RECOMMENDATION:

Routine screening mammogram of both breasts in 1 year.

## 2018-11-16 ENCOUNTER — HOSPITAL ENCOUNTER (OUTPATIENT)
Dept: HOSPITAL 47 - RADCTMAIN | Age: 58
Discharge: HOME | End: 2018-11-16
Attending: INTERNAL MEDICINE
Payer: MEDICARE

## 2018-11-16 DIAGNOSIS — N83.02: Primary | ICD-10-CM

## 2018-11-16 DIAGNOSIS — R10.10: ICD-10-CM

## 2018-11-16 PROCEDURE — 74177 CT ABD & PELVIS W/CONTRAST: CPT

## 2018-11-17 NOTE — CT
EXAMINATION TYPE: CT abdomen pelvis w con

 

DATE OF EXAM: 11/16/2018

 

HISTORY: Mid abdominal pain x2 years.

 

CT DLP: 2615mGycm  Automated Exposure Control for Dose Reduction was Utilized.

 

CONTRAST: 

CT scan of the abdomen and pelvis is performed with IV Contrast, patient injected with 100ml mL of Is
ovue M300.

 

COMPARISON: 6/8/2017 CT thorax.

 

FINDINGS:

 

LUNG BASES: There is a 5 mm solid right middle lobe pulmonary nodule on series 4 image 1. Calcified r
ight hilar lymph nodes are benign.

 

LIVER/GB: There is low-attenuation of the hepatic parenchyma in comparison to that of the spleen appr
oaching criteria for hepatic steatosis. Gallbladder surgically absent. No intrahepatic biliary ductal
 dilatation.

 

PANCREAS: There is mild pancreatic parenchymal atrophy without ductal dilatation.

 

SPLEEN: No significant abnormality is seen.

 

ADRENALS: No significant abnormality is seen.

 

KIDNEYS: Kidneys enhance and excrete symmetrically without hydronephrosis or suspicious renal lesion.


 

BOWEL:  No dilated large or small bowel. Appendix appears retrocecal and within normal limits. Termin
al ileum is incompletely distended but overall unremarkable. Mild amount of retained colonic stool is
 seen.

 

UTERUS/ADNEXA: Uterus appears surgically absent follicular and/or cystic change is seen of the left o
vary.

 

LYMPH NODES: No greater than 1cm abdominal or pelvic lymph nodes are appreciated. There are few promi
nent but nonenlarged right external iliac chain lymph nodes such as on series 3 image 82 measuring 9 
mm in short axis and image 75 measuring 8 mm in short axis.

 

OSSEOUS STRUCTURES: Mild multilevel degenerative changes of the spine are present.

 

IMPRESSION: 

1. Hepatic attenuation approaches criteria for mild hepatic steatosis.

2. Follicular and/or cystic changes of the left ovary that could be further evaluated with pelvic ult
rasound if clinically indicated.

3. 5 mm solid right lower lobe pulmonary nodule stable from the prior of 6/8/2017. 12 month follow-up
 CT thorax is recommended to ensure stability.

## 2019-10-16 ENCOUNTER — HOSPITAL ENCOUNTER (OUTPATIENT)
Dept: HOSPITAL 47 - LABWHC1 | Age: 59
Discharge: HOME | End: 2019-10-16
Attending: INTERNAL MEDICINE
Payer: MEDICARE

## 2019-10-16 DIAGNOSIS — R60.9: ICD-10-CM

## 2019-10-16 DIAGNOSIS — I10: Primary | ICD-10-CM

## 2019-10-16 PROCEDURE — 36415 COLL VENOUS BLD VENIPUNCTURE: CPT

## 2019-10-16 PROCEDURE — 82565 ASSAY OF CREATININE: CPT

## 2019-10-16 PROCEDURE — 80051 ELECTROLYTE PANEL: CPT

## 2019-10-16 PROCEDURE — 84520 ASSAY OF UREA NITROGEN: CPT

## 2019-10-17 LAB
ANION GAP SERPL CALC-SCNC: 8.6 MMOL/L (ref 4–12)
BUN SERPL-SCNC: 23 MG/DL (ref 9–27)
CHLORIDE SERPL-SCNC: 104 MMOL/L (ref 96–109)
CO2 SERPL-SCNC: 28.4 MMOL/L (ref 21.6–31.8)
POTASSIUM SERPL-SCNC: 4 MMOL/L (ref 3.5–5.5)
SODIUM SERPL-SCNC: 141 MMOL/L (ref 135–145)

## 2020-09-04 ENCOUNTER — HOSPITAL ENCOUNTER (OUTPATIENT)
Dept: HOSPITAL 47 - LABWHC1 | Age: 60
Discharge: HOME | End: 2020-09-04
Attending: INTERNAL MEDICINE
Payer: MEDICARE

## 2020-09-04 DIAGNOSIS — Z88.5: ICD-10-CM

## 2020-09-04 DIAGNOSIS — R19.7: ICD-10-CM

## 2020-09-04 DIAGNOSIS — R10.13: ICD-10-CM

## 2020-09-04 DIAGNOSIS — K22.70: Primary | ICD-10-CM

## 2020-09-04 LAB
ALBUMIN SERPL-MCNC: 4.4 G/DL (ref 3.8–4.9)
ALBUMIN/GLOB SERPL: 1.83 G/DL (ref 1.6–3.17)
ALP SERPL-CCNC: 89 U/L (ref 41–126)
ALT SERPL-CCNC: 18 U/L (ref 8–44)
ANION GAP SERPL CALC-SCNC: 6.6 MMOL/L (ref 4–12)
AST SERPL-CCNC: 22 U/L (ref 13–35)
BASOPHILS # BLD AUTO: 0.1 K/UL (ref 0–0.2)
BASOPHILS NFR BLD AUTO: 1 %
BUN SERPL-SCNC: 14 MG/DL (ref 9–27)
BUN/CREAT SERPL: 15.56 RATIO (ref 12–20)
CALCIUM SPEC-MCNC: 9.6 MG/DL (ref 8.7–10.3)
CHLORIDE SERPL-SCNC: 108 MMOL/L (ref 96–109)
CO2 SERPL-SCNC: 28.4 MMOL/L (ref 21.6–31.8)
EOSINOPHIL # BLD AUTO: 0.4 K/UL (ref 0–0.7)
EOSINOPHIL NFR BLD AUTO: 5 %
ERYTHROCYTE [DISTWIDTH] IN BLOOD BY AUTOMATED COUNT: 4.52 M/UL (ref 3.8–5.4)
ERYTHROCYTE [DISTWIDTH] IN BLOOD: 12.9 % (ref 11.5–15.5)
GLIADIN IGA SER-ACNC: <0.2 U/ML
GLIADIN PEPTIDE IGA SER-ACNC: NEGATIVE
GLIADIN PEPTIDE IGG SER-ACNC: NEGATIVE
GLOBULIN SER CALC-MCNC: 2.4 G/DL (ref 1.6–3.3)
GLUCOSE SERPL-MCNC: 96 MG/DL (ref 70–110)
HCT VFR BLD AUTO: 40.1 % (ref 34–46)
HGB BLD-MCNC: 12.8 GM/DL (ref 11.4–16)
LYMPHOCYTES # SPEC AUTO: 1.8 K/UL (ref 1–4.8)
LYMPHOCYTES NFR SPEC AUTO: 24 %
MCH RBC QN AUTO: 28.3 PG (ref 25–35)
MCHC RBC AUTO-ENTMCNC: 31.9 G/DL (ref 31–37)
MCV RBC AUTO: 88.7 FL (ref 80–100)
MONOCYTES # BLD AUTO: 0.4 K/UL (ref 0–1)
MONOCYTES NFR BLD AUTO: 5 %
NEUTROPHILS # BLD AUTO: 4.7 K/UL (ref 1.3–7.7)
NEUTROPHILS NFR BLD AUTO: 63 %
PLATELET # BLD AUTO: 271 K/UL (ref 150–450)
POTASSIUM SERPL-SCNC: 4.3 MMOL/L (ref 3.5–5.5)
PROT SERPL-MCNC: 6.8 G/DL (ref 6.2–8.2)
SODIUM SERPL-SCNC: 143 MMOL/L (ref 135–145)
WBC # BLD AUTO: 7.5 K/UL (ref 3.8–10.6)

## 2020-09-04 PROCEDURE — 80053 COMPREHEN METABOLIC PANEL: CPT

## 2020-09-04 PROCEDURE — 85025 COMPLETE CBC W/AUTO DIFF WBC: CPT

## 2020-09-04 PROCEDURE — 36415 COLL VENOUS BLD VENIPUNCTURE: CPT

## 2020-09-04 PROCEDURE — 83516 IMMUNOASSAY NONANTIBODY: CPT

## 2020-09-15 ENCOUNTER — HOSPITAL ENCOUNTER (OUTPATIENT)
Dept: HOSPITAL 47 - RADCTMAIN | Age: 60
Discharge: HOME | End: 2020-09-15
Attending: INTERNAL MEDICINE
Payer: MEDICARE

## 2020-09-15 DIAGNOSIS — K22.70: Primary | ICD-10-CM

## 2020-09-15 DIAGNOSIS — R10.13: ICD-10-CM

## 2020-09-15 DIAGNOSIS — Z88.5: ICD-10-CM

## 2020-09-15 PROCEDURE — 74177 CT ABD & PELVIS W/CONTRAST: CPT

## 2020-09-16 NOTE — CT
EXAMINATION TYPE: CT abdomen pelvis w con

 

DATE OF EXAM: 9/15/2020

 

COMPARISON: CT abdomen and pelvis 11/16/2018

 

HISTORY: Lower pelvic pain and nausea. Barretts esophagus.

 

CT DLP: 3244.8 mGycm

Automated exposure control for dose reduction was used.

 

TECHNIQUE:  Helical acquisition of images was performed from the lung bases through the pelvis.

 

CONTRAST: 

Performed with Oral Contrast and with IV Contrast, patient injected with 100 mL of Isovue 300.

 

FINDINGS: 

 

LUNG BASES: Normal.

 

LIVER: Normal.

 

BILIARY SYSTEM: Status post cholecystectomy. No intrahepatic or extra hepatic biliary ductal dilatati
on.

 

PANCREAS: Normal.

 

SPLEEN: Normal.

 

ADRENALS: Normal.

 

KIDNEYS: Normal.

 

BOWEL:  Normal.

 

PERITONEUM:  No free air is visualized. No free fluid.

 

ADENOPATHY:  No lymphadenopathy.

 

PELVIS: Normal urine in bladder. Status post hysterectomy. No evidence of adnexal mass.

 

VASCULATURE:  No abdominal aortic aneurysm.

 

MUSCULOSKELETAL:  Degenerative changes of the spine.

 

IMPRESSION: 

No acute abdominopelvic findings to explain patient's symptoms.

## 2021-03-12 ENCOUNTER — HOSPITAL ENCOUNTER (OUTPATIENT)
Dept: HOSPITAL 47 - RADXRMAIN | Age: 61
End: 2021-03-12
Attending: FAMILY MEDICINE
Payer: MEDICARE

## 2021-03-12 DIAGNOSIS — Z53.9: Primary | ICD-10-CM

## 2021-03-26 ENCOUNTER — HOSPITAL ENCOUNTER (OUTPATIENT)
Dept: HOSPITAL 47 - LABWHC1 | Age: 61
Discharge: HOME | End: 2021-03-26
Attending: INTERNAL MEDICINE
Payer: MEDICARE

## 2021-03-26 DIAGNOSIS — I10: ICD-10-CM

## 2021-03-26 DIAGNOSIS — E78.2: Primary | ICD-10-CM

## 2021-03-26 PROCEDURE — 80076 HEPATIC FUNCTION PANEL: CPT

## 2021-03-26 PROCEDURE — 80061 LIPID PANEL: CPT

## 2021-03-26 PROCEDURE — 36415 COLL VENOUS BLD VENIPUNCTURE: CPT

## 2021-03-27 LAB
ALBUMIN SERPL-MCNC: 4.5 G/DL (ref 3.8–4.9)
ALBUMIN/GLOB SERPL: 1.88 G/DL (ref 1.6–3.17)
ALP SERPL-CCNC: 92 U/L (ref 41–126)
ALT SERPL-CCNC: 21 U/L (ref 8–44)
AST SERPL-CCNC: 22 U/L (ref 13–35)
BILIRUB INDIRECT SERPL-MCNC: 0.5 MG/DL
CHOLEST SERPL-MCNC: 238 MG/DL (ref 0–200)
GLOBULIN SER CALC-MCNC: 2.4 G/DL (ref 1.6–3.3)
HDLC SERPL-MCNC: 53 MG/DL (ref 40–60)
LDLC SERPL CALC-MCNC: 163.6 MG/DL (ref 0–131)
PROT SERPL-MCNC: 6.9 G/DL (ref 6.2–8.2)
TRIGL SERPL-MCNC: 107 MG/DL (ref 0–149)
VLDLC SERPL CALC-MCNC: 21.4 MG/DL (ref 5–40)

## 2023-11-01 NOTE — P.DS
Providers


Date of admission: 


03/06/17 12:59





Expected date of discharge: 03/07/17


Attending physician: 


CANDIDO Morrison





Primary care physician: 


Stated None








- Discharge Diagnosis(es)


(1) Cervical stenosis of spinal canal


Current Visit: Yes   Status: Acute   





(2) Degenerative disc disease, cervical


Current Visit: Yes   Status: Acute   





(3) Radiculopathy affecting upper extremity


Current Visit: Yes   Status: Acute   





(4) Herniated nucleus pulposus, cervical


Current Visit: Yes   Status: Acute   


Hospital Course: 





This is a pleasant 56 year old female who presented with C5-6 cervical stenosis 

herniated nucleus pulposus, large osteophytic spurring and degenerative disc 

disease, C6-7 degenerative disc disease, and upper extremity radiculopathy who 

failed outpatient conservative therapy.  She admitted for an anterior cervical 

decompression and fusion C5-6.  The patient tolerated the procedure well and 

did well postoperatively.  She's been able to eat without significant 

difficulty this morning.  She has some soreness in her throat.  She has not had 

any change in her upper extremity radiculopathy symptoms postsurgically.  She 

is able to void without significant difficulty.  Patient states she is ready 

for discharge home.  Condition on day of discharge stable.  Patient will be 

discharged home.  She does have some irritation on the anterior portion of her 

upper chest where the Ioban adhesive tape was placed during surgical 

intervention.  It does not appear hive-like.  Patient was cleared 

preoperatively for surgery.  Patient currently denies any nausea, vomiting, 

fever, or chills.  Patient may shower Tegaderm dressing intact.  Patient may 

remove Tegaderm dressing in 3 days and shower without a dressing at that time.  

Patient should keep Steri-Strips intact and allow them to fall off naturally.  

Patient should refrain from driving until at least after their first follow-up 

appointment in the office.  Patient should avoid excessive neck flexion, 

extension, rotation, and lateral sidebending; no overhead lifting; no lifting 

greater than 10 pounds.  She may resume Norco 10 mg/325 mg as previously 

prescribed following discharge as well as her other home prescribed medications.








Physical Exam on day of discharge:





Patient is awake, alert, and oriented 3


Vital signs stable


Good chest excursion with deep inspiration and expiration


Abdomen soft nontender


No signs or symptoms of DVT; no calf pain


Full range of motion of the cervical spine with adequate flexion, extension, 

and bilateral rotation


 strength, thumb strength, interosseous strength, biceps strength, triceps 

strength, and shoulder strength positive sustained bilaterally


Soft cervical collar at the bedside but not currently intact


Incision is dry and intact; no erythema, purulence, or signs of infection; 

there is one small area of dried blood on the Telfa


Area of red irritation on the anterior upper portion of the chest at the 

previous location of the Ioban placement which was placed during prepping for 

surgical intervention


Tegaderm dressing and non-stick Telfa intact


Procedures: 





Anterior cervical decompression and fusion C5-6


Patient Condition at Discharge: Stable





Plan - Discharge Summary


Discharge Medication List





Citalopram Hydrobromide [Citalopram HBr] 40 mg PO QAM 01/08/15 [History]


Diltiazem Oral [Cardizem*] 15 mg PO BID 12/02/15 [History]


Aspirin [Adult Low Dose Aspirin EC] 81 mg PO DAILY 01/19/17 [History]


Cholecalciferol [Vitamin D3] 5,000 unit PO DAILY 01/19/17 [History]


Oxybutynin Chloride [Oxybutynin Chloride ER] 10 mg PO BID 01/19/17 [History]


Albuterol Nebulized [Ventolin Nebulized] 2.5 mg INHALATION RT-BID PRN 02/28/17 [

History]


Budesonide/Formoterol Fumarate [Symbicort 80-4.5 Mcg Inhaler] 2 puff INHALATION 

RT-DAILY 02/28/17 [History]


HYDROcodone/APAP 10-325MG [Norco ] 1 tab PO Q6H PRN 02/28/17 [History]








Follow up Appointment(s)/Referral(s): 


Cabrera Smith, RICKIE [PHYSICIAN ASSISTANT] - 2 Weeks


(Patient may follow-up with Cabrera Smith PA-C or Dr. Tommy Morrison at [facility] in 2-3 weeks following discharge.


)


Activity/Diet/Wound Care/Special Instructions: 


1.  Patient may shower Tegaderm dressing intact.  


2.  Patient may remove Tegaderm dressing in 3 days and shower without a 

dressing at that time.  


3.  Patient should keep Steri-Strips intact and allow them to fall off 

naturally.  


4.  Patient should refrain from driving until at least after their first follow-

up appointment in the office.


5.  Patient should avoid excessive neck flexion, extension, rotation, and 

sidebending; avoid overhead lifting; no lifting greater than 10 pounds


6.  Do not soak in tub


Discharge Disposition: HOME SELF-CARE Normal vision: sees adequately in most situations; can see medication labels, newsprint